# Patient Record
Sex: FEMALE | Race: BLACK OR AFRICAN AMERICAN | NOT HISPANIC OR LATINO | Employment: FULL TIME | URBAN - METROPOLITAN AREA
[De-identification: names, ages, dates, MRNs, and addresses within clinical notes are randomized per-mention and may not be internally consistent; named-entity substitution may affect disease eponyms.]

---

## 2023-02-23 LAB
ALANINE AMINOTRANSFERASE (SGPT) (U/L) IN SER/PLAS: 11 U/L (ref 7–45)
ALBUMIN (G/DL) IN SER/PLAS: 3.6 G/DL (ref 3.4–5)
ALKALINE PHOSPHATASE (U/L) IN SER/PLAS: 73 U/L (ref 33–110)
ASPARTATE AMINOTRANSFERASE (SGOT) (U/L) IN SER/PLAS: 17 U/L (ref 9–39)
BILIRUBIN DIRECT (MG/DL) IN SER/PLAS: 0.1 MG/DL (ref 0–0.3)
BILIRUBIN TOTAL (MG/DL) IN SER/PLAS: 0.5 MG/DL (ref 0–1.2)
PROTEIN TOTAL: 8.3 G/DL (ref 6.4–8.2)

## 2023-03-20 LAB
ALANINE AMINOTRANSFERASE (SGPT) (U/L) IN SER/PLAS: 14 U/L (ref 7–45)
ALBUMIN (G/DL) IN SER/PLAS: 3.7 G/DL (ref 3.4–5)
ALKALINE PHOSPHATASE (U/L) IN SER/PLAS: 79 U/L (ref 33–110)
ANION GAP IN SER/PLAS: 10 MMOL/L (ref 10–20)
ASPARTATE AMINOTRANSFERASE (SGOT) (U/L) IN SER/PLAS: 19 U/L (ref 9–39)
BILIRUBIN TOTAL (MG/DL) IN SER/PLAS: 0.3 MG/DL (ref 0–1.2)
CALCIUM (MG/DL) IN SER/PLAS: 9.6 MG/DL (ref 8.6–10.6)
CARBON DIOXIDE, TOTAL (MMOL/L) IN SER/PLAS: 31 MMOL/L (ref 21–32)
CHLORIDE (MMOL/L) IN SER/PLAS: 105 MMOL/L (ref 98–107)
CREATININE (MG/DL) IN SER/PLAS: 0.95 MG/DL (ref 0.5–1.05)
ERYTHROCYTE DISTRIBUTION WIDTH (RATIO) BY AUTOMATED COUNT: 15.1 % (ref 11.5–14.5)
ERYTHROCYTE MEAN CORPUSCULAR HEMOGLOBIN CONCENTRATION (G/DL) BY AUTOMATED: 30.5 G/DL (ref 32–36)
ERYTHROCYTE MEAN CORPUSCULAR VOLUME (FL) BY AUTOMATED COUNT: 93 FL (ref 80–100)
ERYTHROCYTES (10*6/UL) IN BLOOD BY AUTOMATED COUNT: 4.15 X10E12/L (ref 4–5.2)
FOLATE (NG/ML) IN SER/PLAS: 7.9 NG/ML
GFR FEMALE: 69 ML/MIN/1.73M2
GLUCOSE (MG/DL) IN SER/PLAS: 95 MG/DL (ref 74–99)
HEMATOCRIT (%) IN BLOOD BY AUTOMATED COUNT: 38.4 % (ref 36–46)
HEMOGLOBIN (G/DL) IN BLOOD: 11.7 G/DL (ref 12–16)
IRON (UG/DL) IN SER/PLAS: 31 UG/DL (ref 35–150)
IRON BINDING CAPACITY (UG/DL) IN SER/PLAS: 318 UG/DL (ref 240–445)
IRON SATURATION (%) IN SER/PLAS: 10 % (ref 25–45)
LEUKOCYTES (10*3/UL) IN BLOOD BY AUTOMATED COUNT: 5.8 X10E9/L (ref 4.4–11.3)
NRBC (PER 100 WBCS) BY AUTOMATED COUNT: 0 /100 WBC (ref 0–0)
PLATELETS (10*3/UL) IN BLOOD AUTOMATED COUNT: 268 X10E9/L (ref 150–450)
POTASSIUM (MMOL/L) IN SER/PLAS: 3.6 MMOL/L (ref 3.5–5.3)
PROTEIN TOTAL: 7.9 G/DL (ref 6.4–8.2)
SODIUM (MMOL/L) IN SER/PLAS: 142 MMOL/L (ref 136–145)
UREA NITROGEN (MG/DL) IN SER/PLAS: 14 MG/DL (ref 6–23)

## 2023-05-09 ENCOUNTER — PATIENT OUTREACH (OUTPATIENT)
Dept: PRIMARY CARE | Facility: CLINIC | Age: 58
End: 2023-05-09
Payer: COMMERCIAL

## 2023-05-09 DIAGNOSIS — R50.9 FEVER WITH CHILLS: ICD-10-CM

## 2023-05-09 NOTE — PROGRESS NOTES
TCM complete:5/9/23  Discharge date:5/7/23  2 attempts were made to reach patient to assess needs. No return call as of this note.   If patient schedules follow up within 14 days of discharge, visit is TCM billable.  Message sent to practice clinical pool to reach out to patient and schedule an appointment within 7-13 days from discharge date.    If patient meets criteria for moderately complex & has follow-up within 14 days-can bill [phone] for hospital follow up.   If patient meets criteria for highly complex & has follow-up visit within 7 days-can bill [phone] for hospital follow up

## 2023-06-06 NOTE — PROGRESS NOTES
Patient never followed up with PCP.  Unable to reach patient for call back after patient's follow up appointment with PCP.  LVM  with call back number for patient to call if needed.

## 2023-06-07 DIAGNOSIS — K21.9 GASTROESOPHAGEAL REFLUX DISEASE WITHOUT ESOPHAGITIS: ICD-10-CM

## 2023-06-07 RX ORDER — OMEPRAZOLE 40 MG/1
40 CAPSULE, DELAYED RELEASE ORAL DAILY
COMMUNITY
Start: 2020-12-16 | End: 2023-06-07 | Stop reason: SDUPTHER

## 2023-06-07 RX ORDER — OMEPRAZOLE 40 MG/1
40 CAPSULE, DELAYED RELEASE ORAL DAILY
Qty: 90 CAPSULE | Refills: 1 | Status: SHIPPED | OUTPATIENT
Start: 2023-06-07 | End: 2023-11-07 | Stop reason: SDUPTHER

## 2023-08-08 DIAGNOSIS — I10 BENIGN ESSENTIAL HYPERTENSION: ICD-10-CM

## 2023-08-08 RX ORDER — AMLODIPINE BESYLATE 5 MG/1
5 TABLET ORAL DAILY
Qty: 90 TABLET | Refills: 0 | Status: SHIPPED | OUTPATIENT
Start: 2023-08-08 | End: 2023-11-07 | Stop reason: SDUPTHER

## 2023-08-08 RX ORDER — AMLODIPINE BESYLATE 5 MG/1
5 TABLET ORAL DAILY
COMMUNITY
End: 2023-08-08 | Stop reason: SDUPTHER

## 2023-09-18 ENCOUNTER — LAB (OUTPATIENT)
Dept: LAB | Facility: LAB | Age: 58
End: 2023-09-18
Payer: COMMERCIAL

## 2023-09-18 ENCOUNTER — OFFICE VISIT (OUTPATIENT)
Dept: PRIMARY CARE | Facility: CLINIC | Age: 58
End: 2023-09-18
Payer: COMMERCIAL

## 2023-09-18 VITALS
SYSTOLIC BLOOD PRESSURE: 122 MMHG | BODY MASS INDEX: 28.71 KG/M2 | WEIGHT: 156 LBS | DIASTOLIC BLOOD PRESSURE: 80 MMHG | OXYGEN SATURATION: 98 % | HEART RATE: 68 BPM | HEIGHT: 62 IN

## 2023-09-18 DIAGNOSIS — R73.9 ELEVATED BLOOD SUGAR: ICD-10-CM

## 2023-09-18 DIAGNOSIS — I10 BENIGN ESSENTIAL HYPERTENSION: ICD-10-CM

## 2023-09-18 DIAGNOSIS — D50.8 OTHER IRON DEFICIENCY ANEMIA: ICD-10-CM

## 2023-09-18 DIAGNOSIS — E55.9 VITAMIN D DEFICIENCY: ICD-10-CM

## 2023-09-18 DIAGNOSIS — I10 BENIGN ESSENTIAL HYPERTENSION: Primary | ICD-10-CM

## 2023-09-18 DIAGNOSIS — I67.1 ANEURYSM, CEREBRAL (HHS-HCC): ICD-10-CM

## 2023-09-18 DIAGNOSIS — I47.10 SUPRAVENTRICULAR TACHYCARDIA (CMS-HCC): ICD-10-CM

## 2023-09-18 PROBLEM — Q61.2 AUTOSOMAL DOMINANT POLYCYSTIC KIDNEY DISEASE: Status: ACTIVE | Noted: 2023-09-18

## 2023-09-18 PROBLEM — K21.9 GERD (GASTROESOPHAGEAL REFLUX DISEASE): Status: ACTIVE | Noted: 2023-09-18

## 2023-09-18 PROBLEM — J30.9 ALLERGIC RHINITIS: Status: ACTIVE | Noted: 2023-09-18

## 2023-09-18 LAB
ALANINE AMINOTRANSFERASE (SGPT) (U/L) IN SER/PLAS: 16 U/L (ref 7–45)
ALBUMIN (G/DL) IN SER/PLAS: 4.2 G/DL (ref 3.4–5)
ALKALINE PHOSPHATASE (U/L) IN SER/PLAS: 93 U/L (ref 33–110)
ANION GAP IN SER/PLAS: 12 MMOL/L (ref 10–20)
ASPARTATE AMINOTRANSFERASE (SGOT) (U/L) IN SER/PLAS: 20 U/L (ref 9–39)
BILIRUBIN TOTAL (MG/DL) IN SER/PLAS: 0.4 MG/DL (ref 0–1.2)
CALCIDIOL (25 OH VITAMIN D3) (NG/ML) IN SER/PLAS: 36 NG/ML
CALCIUM (MG/DL) IN SER/PLAS: 9.7 MG/DL (ref 8.6–10.6)
CARBON DIOXIDE, TOTAL (MMOL/L) IN SER/PLAS: 31 MMOL/L (ref 21–32)
CHLORIDE (MMOL/L) IN SER/PLAS: 104 MMOL/L (ref 98–107)
COBALAMIN (VITAMIN B12) (PG/ML) IN SER/PLAS: 465 PG/ML (ref 211–911)
CREATININE (MG/DL) IN SER/PLAS: 0.92 MG/DL (ref 0.5–1.05)
ESTIMATED AVERAGE GLUCOSE FOR HBA1C: 120 MG/DL
FERRITIN (UG/LL) IN SER/PLAS: 49 UG/L (ref 8–150)
FOLATE (NG/ML) IN SER/PLAS: 18.9 NG/ML
GFR FEMALE: 72 ML/MIN/1.73M2
GLUCOSE (MG/DL) IN SER/PLAS: 94 MG/DL (ref 74–99)
HEMOGLOBIN A1C/HEMOGLOBIN TOTAL IN BLOOD: 5.8 %
IRON (UG/DL) IN SER/PLAS: 58 UG/DL (ref 35–150)
IRON BINDING CAPACITY (UG/DL) IN SER/PLAS: 360 UG/DL (ref 240–445)
IRON SATURATION (%) IN SER/PLAS: 16 % (ref 25–45)
MAGNESIUM (MG/DL) IN SER/PLAS: 2.08 MG/DL (ref 1.6–2.4)
POTASSIUM (MMOL/L) IN SER/PLAS: 3.8 MMOL/L (ref 3.5–5.3)
PROTEIN TOTAL: 7.8 G/DL (ref 6.4–8.2)
SODIUM (MMOL/L) IN SER/PLAS: 143 MMOL/L (ref 136–145)
THYROTROPIN (MIU/L) IN SER/PLAS BY DETECTION LIMIT <= 0.05 MIU/L: 0.42 MIU/L (ref 0.44–3.98)
THYROXINE (T4) FREE (NG/DL) IN SER/PLAS: 1.29 NG/DL (ref 0.78–1.48)
UREA NITROGEN (MG/DL) IN SER/PLAS: 13 MG/DL (ref 6–23)

## 2023-09-18 PROCEDURE — 83540 ASSAY OF IRON: CPT

## 2023-09-18 PROCEDURE — 80053 COMPREHEN METABOLIC PANEL: CPT

## 2023-09-18 PROCEDURE — 84443 ASSAY THYROID STIM HORMONE: CPT

## 2023-09-18 PROCEDURE — 3079F DIAST BP 80-89 MM HG: CPT | Performed by: FAMILY MEDICINE

## 2023-09-18 PROCEDURE — 3074F SYST BP LT 130 MM HG: CPT | Performed by: FAMILY MEDICINE

## 2023-09-18 PROCEDURE — 82746 ASSAY OF FOLIC ACID SERUM: CPT

## 2023-09-18 PROCEDURE — 83550 IRON BINDING TEST: CPT

## 2023-09-18 PROCEDURE — 1036F TOBACCO NON-USER: CPT | Performed by: FAMILY MEDICINE

## 2023-09-18 PROCEDURE — 82607 VITAMIN B-12: CPT

## 2023-09-18 PROCEDURE — 82728 ASSAY OF FERRITIN: CPT

## 2023-09-18 PROCEDURE — 82306 VITAMIN D 25 HYDROXY: CPT

## 2023-09-18 PROCEDURE — 84439 ASSAY OF FREE THYROXINE: CPT

## 2023-09-18 PROCEDURE — 36415 COLL VENOUS BLD VENIPUNCTURE: CPT

## 2023-09-18 PROCEDURE — 83735 ASSAY OF MAGNESIUM: CPT

## 2023-09-18 PROCEDURE — 99213 OFFICE O/P EST LOW 20 MIN: CPT | Performed by: FAMILY MEDICINE

## 2023-09-18 PROCEDURE — 83036 HEMOGLOBIN GLYCOSYLATED A1C: CPT

## 2023-09-18 RX ORDER — FLUTICASONE PROPIONATE 50 MCG
1 SPRAY, SUSPENSION (ML) NASAL 2 TIMES DAILY
COMMUNITY
End: 2023-12-14 | Stop reason: SDUPTHER

## 2023-09-18 ASSESSMENT — PATIENT HEALTH QUESTIONNAIRE - PHQ9
SUM OF ALL RESPONSES TO PHQ9 QUESTIONS 1 AND 2: 0
1. LITTLE INTEREST OR PLEASURE IN DOING THINGS: NOT AT ALL
2. FEELING DOWN, DEPRESSED OR HOPELESS: NOT AT ALL

## 2023-09-18 ASSESSMENT — ENCOUNTER SYMPTOMS
LOSS OF SENSATION IN FEET: 0
DEPRESSION: 0
OCCASIONAL FEELINGS OF UNSTEADINESS: 0

## 2023-09-18 NOTE — PROGRESS NOTES
"Subjective   Patient ID: Mandy Taylor is a 57 y.o. female who presents for Leg Pain (Right Leg).    HPI   The patient reports that she has been experiencing leg pain especially when she is laying down at nights. When she is laying on her back she does not experience any pain but the pain comes on when she moves to the left or right side. Her blood pressures are well-controlled at this time and she is taking amlodipine as prescribed. The patient is also taking omeprazole for GERD.     Review of Systems  Constitutional: No fever or chills  Cardiovascular: no chest pain, no palpitations and no syncope.   Respiratory: no cough, no shortness of breath during exertion and no shortness of breath at rest.   Gastrointestinal: no abdominal pain, no nausea and no vomiting.  Neuro: No Headache, no dizziness  MSK: + leg cramps.    Objective   /80   Pulse 68   Ht 1.575 m (5' 2\")   Wt 70.8 kg (156 lb)   SpO2 98%   BMI 28.53 kg/m²     Physical Exam  Constitutional: Alert and in no acute distress. Well developed, well nourished  Head and Face: Head and face: Normal.    Cardiovascular: Heart rate and rhythm were normal, normal S1 and S2. No peripheral edema.   Pulmonary: No respiratory distress. Clear bilateral breath sounds.  Musculoskeletal: Gait and station: Normal. Muscle strength/tone: Normal.   Skin: Normal skin color and pigmentation, normal skin turgor, and no rash.    Psychiatric: Judgment and insight: Intact. Mood and affect: Normal.    Lab Results   Component Value Date    WBC 5.6 05/07/2023    HGB 12.2 05/07/2023    HCT 39.3 05/07/2023     05/07/2023    CHOL 201 (H) 11/08/2022    TRIG 61 11/08/2022    HDL 76.1 11/08/2022    ALT 16 05/03/2023    AST 22 05/03/2023     05/07/2023    K 4.4 05/07/2023     05/07/2023    CREATININE 0.87 05/07/2023    BUN 19 05/07/2023    CO2 30 05/07/2023    TSH 0.54 12/26/2022    INR 1.2 (H) 02/07/2023    HGBA1C 5.7 (A) 10/12/2021       BI digital DIAG " mamm  Narrative: Interpreted By:  GALLITO MUJICA MD  MRN: [id number]  Patient Name: DEBRAVANESSA ROSANITA     STUDY:  DIGITAL DIAG MAMM BILAT WITH MATHIEU;  8/14/2023 9:31 am     ACCESSION NUMBER(S):  [id number]     ORDERING CLINICIAN:  JOHANN LION     INDICATION:  2nd short-term follow-up of probably benign right breast  calcifications. Annual mammogram.     COMPARISON:  08/12/2022, 03/19/2021 and 03/12/2021.     FINDINGS:  2D and tomosynthesis images were reviewed at 1 mm slice thickness.     Density:  There are areas of scattered fibroglandular tissue.     The calcifications of concern in the central right breast at mid  depth are unchanged. No suspicious masses or calcifications are  identified.     Impression: No mammographic evidence of malignancy.  Stable benign right breast  calcifications. No additional follow-up is warranted. Recommendations  are for a bilateral screening mammogram in 1 year.     BI-RADS CATEGORY:     Category: 2 - Benign.  Recommendation: 1 Year Screening.     For any future breast imaging appointments, please call [phone]  [phone]).     Patient letter sent SNORM     MACRO:  None      Assessment/Plan   Diagnoses and all orders for this visit:  Benign essential hypertension  -     Comprehensive Metabolic Panel; Future  -     Magnesium; Future  Supraventricular tachycardia (CMS/HCC)  Aneurysm, cerebral  Vitamin D deficiency  -     Vitamin D 25-Hydroxy,Total (for eval of Vitamin D levels); Future  Other iron deficiency anemia  -     Ferritin; Future  -     Iron and TIBC; Future  -     Vitamin B12; Future  -     Folate; Future  Elevated blood sugar  -     Hemoglobin A1C; Future  -     TSH with reflex to Free T4 if abnormal; Future        Dear Mandy Taylor     It was my pleasure to take care of you today in the office. Below are the things we discussed today:    1. Leg cramps: Labs ordered.    2. Hypertension: Well- controlled. Continue amlodipine.     3. GERD: Continue omeprazole.     Your yearly  Physical is due in: Nov 2023  When you call the office for your yearly Physical, please ask them to inform me to order your blood work, so that you can get the fasting blood work before your appointment and we can discuss the results at your physical.      Please call me if any questions arise from now until your next visit. I will call you after I am done seeing patients. A Doctor is always available by phone when the office is closed. Please feel free to call for help with any problem that you feel shouldn't wait until the office re-opens.     Scribe Attestation  By signing my name below, INanette Scribe   attest that this documentation has been prepared under the direction and in the presence of Martha Connell MD.

## 2023-09-21 ENCOUNTER — TELEPHONE (OUTPATIENT)
Dept: PRIMARY CARE | Facility: CLINIC | Age: 58
End: 2023-09-21
Payer: COMMERCIAL

## 2023-10-04 ENCOUNTER — TELEPHONE (OUTPATIENT)
Dept: PRIMARY CARE | Facility: CLINIC | Age: 58
End: 2023-10-04
Payer: COMMERCIAL

## 2023-10-04 DIAGNOSIS — R25.2 LEG CRAMPS: Primary | ICD-10-CM

## 2023-10-11 NOTE — PROGRESS NOTES
Referred by Dr. Mo for aortic plaque     History Of Present Illness:    Mandy Taylor is a 58 y.o. 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 Mo for aortic valve dx. says she feels great since ablation.  No further palpitations.  She denies any chest pain, shortness of breath, lightheadedness dizziness presyncope or syncope.  Non-smoker and rare EtOH.  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    I reviewed echo and CT scan. No evidence of aortic stenosis or significant aortic plaque that I can ascertain.     ECHO 12/14/2022:  1. Left ventricular systolic function is hyperdynamic with a 70-75% estimated ejection fraction.   2. Mildly elevated RVSP.   3. Mild aortic valve stenosis.   4. There is plaque visualized in the ascending aorta.   5. LVEF is more hyperdynamic. Prior study did not comment on Aortic Gradients. Otherwise, no significant changes noted.      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    Past Surgical History:  She has a past surgical history that includes Anterior cruciate ligament repair (06/25/2013); MR angio head wo IV contrast (12/23/2016); and US guided percutaneous peritoneal or retroperitoneal fluid collection drainage (2/6/2023).      Social History:  She reports that she has never smoked. She has never used smokeless tobacco. No history on file for alcohol use and drug use.    Family History:  No family history on file.     Allergies:  Ace inhibitors and Iodinated contrast media    Outpatient Medications:  Current  Outpatient Medications   Medication Instructions    amLODIPine (NORVASC) 5 mg, oral, Daily    fluticasone (Flonase) 50 mcg/actuation nasal spray 1 spray, nasal, 2 times daily    omeprazole (PRILOSEC) 40 mg, oral, Daily        Last Recorded Vitals:  Vitals:    10/13/23 0912 10/13/23 0939   BP: (!) 151/92 122/82   BP Location: Left arm    Pulse: 75    SpO2: 96%    Weight: 71.7 kg (158 lb)        Physical Exam:  GENERAL: alert, cooperative, pleasant, in no acute distress  SKIN: warm, dry, no rash.  NECK: no JVD, no TABBY  CARDIAC: Regular rate and rhythm with no rubs, murmurs, or gallops  CHEST: Normal respiratory efforts, lungs clear to auscultation bilaterally.  ABDOMEN: soft, nontender, nondistended  EXTREMITIES: no edema  NEURO: Alert and oriented x 3.  Grossly normal.  Moves all 4 extremities.         Last Labs:  CBC -  Lab Results   Component Value Date    WBC 5.6 05/07/2023    HGB 12.2 05/07/2023    HCT 39.3 05/07/2023    MCV 92 05/07/2023     05/07/2023       CMP -  Lab Results   Component Value Date    CALCIUM 9.7 09/18/2023    PHOS 3.5 05/06/2023    PROT 7.8 09/18/2023    ALBUMIN 4.2 09/18/2023    AST 20 09/18/2023    ALT 16 09/18/2023    ALKPHOS 93 09/18/2023    BILITOT 0.4 09/18/2023       LIPID PANEL -   Lab Results   Component Value Date    CHOL 201 (H) 11/08/2022    TRIG 61 11/08/2022    HDL 76.1 11/08/2022    CHHDL 2.6 11/08/2022    LDLF 113 (H) 11/08/2022    VLDL 12 11/08/2022       RENAL FUNCTION PANEL -   Lab Results   Component Value Date    GLUCOSE 94 09/18/2023     09/18/2023    K 3.8 09/18/2023     09/18/2023    CO2 31 09/18/2023    ANIONGAP 12 09/18/2023    BUN 13 09/18/2023    CREATININE 0.92 09/18/2023    CALCIUM 9.7 09/18/2023    PHOS 3.5 05/06/2023    ALBUMIN 4.2 09/18/2023        Lab Results   Component Value Date     (H) 12/29/2021    HGBA1C 5.8 (A) 09/18/2023       Last Cardiology Tests:    Echo:12/14/22  ONCLUSIONS:   1. Left ventricular systolic function is  hyperdynamic with a 70-75% estimated ejection fraction.   2. Mildly elevated RVSP.   3. Mild aortic valve stenosis.   4. There is plaque visualized in the ascending aorta.   5. LVEF is more hyperdynamic. Prior study did not comment on Aortic Gradients. Otherwise, no significant changes noted.         Assessment/Plan   58 y.o. 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 No evidence of aortic stenosis or significant aortic plaque that I can ascertain. RecommendCT coronary calcium score for further evaluation to discern if any aortic plaque as well as coronary risk score.  Follow-up TBD based on testing    Junior Powell DO  [facility] [place]  Clinical and Preventive Cardiology  Director, Cardiac Rehabilitation

## 2023-10-12 PROBLEM — M75.32: Status: ACTIVE | Noted: 2023-10-12

## 2023-10-12 PROBLEM — B37.31 VAGINAL CANDIDIASIS: Status: ACTIVE | Noted: 2023-10-12

## 2023-10-12 PROBLEM — K21.9 GASTROESOPHAGEAL REFLUX DISEASE WITHOUT ESOPHAGITIS: Status: ACTIVE | Noted: 2023-02-11

## 2023-10-12 PROBLEM — E04.9 NONTOXIC GOITER: Status: ACTIVE | Noted: 2023-02-11

## 2023-10-12 PROBLEM — E87.6 HYPOKALEMIA: Status: ACTIVE | Noted: 2023-02-11

## 2023-10-12 PROBLEM — Z87.891 PERSONAL HISTORY OF NICOTINE DEPENDENCE: Status: ACTIVE | Noted: 2023-02-11

## 2023-10-12 PROBLEM — N83.209 HEMORRHAGIC OVARIAN CYST: Status: ACTIVE | Noted: 2023-10-12

## 2023-10-12 PROBLEM — K75.0 ABSCESS OF LIVER (HHS-HCC): Status: ACTIVE | Noted: 2023-02-11

## 2023-10-12 PROBLEM — H04.122 DRY EYE SYNDROME OF LEFT LACRIMAL GLAND: Status: RESOLVED | Noted: 2023-10-12 | Resolved: 2023-10-12

## 2023-10-12 PROBLEM — H04.122 DRY EYE SYNDROME OF LEFT LACRIMAL GLAND: Status: ACTIVE | Noted: 2023-10-12

## 2023-10-12 PROBLEM — H01.019 BLEPHARITIS, ULCERATIVE: Status: ACTIVE | Noted: 2023-10-12

## 2023-10-12 PROBLEM — M99.01 SEGMENTAL AND SOMATIC DYSFUNCTION OF CERVICAL REGION: Status: ACTIVE | Noted: 2023-10-12

## 2023-10-12 PROBLEM — R35.0 URINARY FREQUENCY: Status: ACTIVE | Noted: 2023-10-12

## 2023-10-12 PROBLEM — H61.23 BILATERAL IMPACTED CERUMEN: Status: ACTIVE | Noted: 2023-10-12

## 2023-10-12 PROBLEM — M75.31 CALCIFIC TENDINITIS OF RIGHT SHOULDER: Status: ACTIVE | Noted: 2023-10-12

## 2023-10-12 PROBLEM — Z79.2 LONG TERM (CURRENT) USE OF ANTIBIOTICS: Status: ACTIVE | Noted: 2023-02-11

## 2023-10-12 PROBLEM — I10 PRIMARY HYPERTENSION: Status: ACTIVE | Noted: 2023-01-16

## 2023-10-12 PROBLEM — N18.2 CKD (CHRONIC KIDNEY DISEASE) STAGE 2, GFR 60-89 ML/MIN: Status: ACTIVE | Noted: 2023-01-16

## 2023-10-12 PROBLEM — Q44.6 CYSTIC DISEASE OF LIVER: Status: ACTIVE | Noted: 2023-02-11

## 2023-10-12 PROBLEM — Q61.3 POLYCYSTIC KIDNEY: Status: ACTIVE | Noted: 2023-01-16

## 2023-10-12 PROBLEM — D64.9 ANEMIA: Status: ACTIVE | Noted: 2023-10-12

## 2023-10-12 PROBLEM — Z86.79 HISTORY OF PAROXYSMAL SUPRAVENTRICULAR TACHYCARDIA: Status: ACTIVE | Noted: 2023-10-12

## 2023-10-12 PROBLEM — H04.123 DRY EYE SYNDROME OF BILATERAL LACRIMAL GLANDS: Status: ACTIVE | Noted: 2023-10-12

## 2023-10-12 PROBLEM — G25.89 SCAPULAR DYSKINESIS: Status: ACTIVE | Noted: 2023-10-12

## 2023-10-12 PROBLEM — N20.0 CALCULUS OF KIDNEY: Status: ACTIVE | Noted: 2023-02-11

## 2023-10-12 RX ORDER — CETIRIZINE HYDROCHLORIDE 10 MG/1
TABLET, CHEWABLE ORAL DAILY
COMMUNITY

## 2023-10-12 RX ORDER — CIPROFLOXACIN 750 MG/1
750 TABLET, FILM COATED ORAL 2 TIMES DAILY
COMMUNITY
Start: 2023-02-09 | End: 2023-11-07 | Stop reason: ALTCHOICE

## 2023-10-12 RX ORDER — IBUPROFEN 800 MG/1
800 TABLET ORAL EVERY 6 HOURS PRN
COMMUNITY
End: 2023-11-07 | Stop reason: ALTCHOICE

## 2023-10-12 RX ORDER — OMEPRAZOLE 40 MG/1
40 CAPSULE, DELAYED RELEASE ORAL DAILY
COMMUNITY
Start: 2023-02-11 | End: 2023-11-07 | Stop reason: SDUPTHER

## 2023-10-13 ENCOUNTER — OFFICE VISIT (OUTPATIENT)
Dept: CARDIOLOGY | Facility: CLINIC | Age: 58
End: 2023-10-13
Payer: COMMERCIAL

## 2023-10-13 ENCOUNTER — HOSPITAL ENCOUNTER (OUTPATIENT)
Dept: VASCULAR MEDICINE | Facility: HOSPITAL | Age: 58
Discharge: HOME | End: 2023-10-13
Payer: COMMERCIAL

## 2023-10-13 VITALS
OXYGEN SATURATION: 96 % | WEIGHT: 158 LBS | BODY MASS INDEX: 28.9 KG/M2 | DIASTOLIC BLOOD PRESSURE: 82 MMHG | HEART RATE: 75 BPM | SYSTOLIC BLOOD PRESSURE: 122 MMHG

## 2023-10-13 DIAGNOSIS — M79.604 PAIN IN RIGHT LEG: ICD-10-CM

## 2023-10-13 DIAGNOSIS — M79.605 PAIN IN LEFT LEG: ICD-10-CM

## 2023-10-13 DIAGNOSIS — R25.2 LEG CRAMPS: ICD-10-CM

## 2023-10-13 DIAGNOSIS — I10 HYPERTENSION, UNSPECIFIED TYPE: ICD-10-CM

## 2023-10-13 DIAGNOSIS — I47.19 AVNRT (AV NODAL RE-ENTRY TACHYCARDIA) (CMS-HCC): ICD-10-CM

## 2023-10-13 DIAGNOSIS — I70.0 AORTIC ATHEROSCLEROSIS (CMS-HCC): Primary | ICD-10-CM

## 2023-10-13 PROCEDURE — 93970 EXTREMITY STUDY: CPT

## 2023-10-13 PROCEDURE — 3079F DIAST BP 80-89 MM HG: CPT | Performed by: INTERNAL MEDICINE

## 2023-10-13 PROCEDURE — 99215 OFFICE O/P EST HI 40 MIN: CPT | Performed by: INTERNAL MEDICINE

## 2023-10-13 PROCEDURE — 3074F SYST BP LT 130 MM HG: CPT | Performed by: INTERNAL MEDICINE

## 2023-10-13 PROCEDURE — 1036F TOBACCO NON-USER: CPT | Performed by: INTERNAL MEDICINE

## 2023-10-13 PROCEDURE — 93970 EXTREMITY STUDY: CPT | Performed by: INTERNAL MEDICINE

## 2023-11-07 ENCOUNTER — OFFICE VISIT (OUTPATIENT)
Dept: PRIMARY CARE | Facility: CLINIC | Age: 58
End: 2023-11-07
Payer: COMMERCIAL

## 2023-11-07 VITALS
WEIGHT: 154 LBS | SYSTOLIC BLOOD PRESSURE: 132 MMHG | BODY MASS INDEX: 27.29 KG/M2 | HEART RATE: 85 BPM | DIASTOLIC BLOOD PRESSURE: 88 MMHG | HEIGHT: 63 IN | OXYGEN SATURATION: 97 %

## 2023-11-07 DIAGNOSIS — Z12.31 VISIT FOR SCREENING MAMMOGRAM: ICD-10-CM

## 2023-11-07 DIAGNOSIS — Q61.2 AUTOSOMAL DOMINANT POLYCYSTIC KIDNEY DISEASE: ICD-10-CM

## 2023-11-07 DIAGNOSIS — K21.9 GASTROESOPHAGEAL REFLUX DISEASE WITHOUT ESOPHAGITIS: ICD-10-CM

## 2023-11-07 DIAGNOSIS — D50.8 OTHER IRON DEFICIENCY ANEMIA: ICD-10-CM

## 2023-11-07 DIAGNOSIS — Z00.00 ANNUAL PHYSICAL EXAM: Primary | ICD-10-CM

## 2023-11-07 DIAGNOSIS — Z86.79 HISTORY OF PAROXYSMAL SUPRAVENTRICULAR TACHYCARDIA: ICD-10-CM

## 2023-11-07 DIAGNOSIS — Z23 ENCOUNTER FOR IMMUNIZATION: ICD-10-CM

## 2023-11-07 DIAGNOSIS — I10 BENIGN ESSENTIAL HYPERTENSION: ICD-10-CM

## 2023-11-07 PROBLEM — I47.10 SUSTAINED SUPRAVENTRICULAR TACHYCARDIA (CMS-HCC): Status: RESOLVED | Noted: 2023-09-18 | Resolved: 2023-11-07

## 2023-11-07 PROBLEM — Q61.3 POLYCYSTIC KIDNEY: Status: RESOLVED | Noted: 2023-01-16 | Resolved: 2023-11-07

## 2023-11-07 PROBLEM — B37.31 VAGINAL CANDIDIASIS: Status: RESOLVED | Noted: 2023-10-12 | Resolved: 2023-11-07

## 2023-11-07 PROBLEM — Z79.2 LONG TERM (CURRENT) USE OF ANTIBIOTICS: Status: RESOLVED | Noted: 2023-02-11 | Resolved: 2023-11-07

## 2023-11-07 PROCEDURE — 99396 PREV VISIT EST AGE 40-64: CPT | Performed by: FAMILY MEDICINE

## 2023-11-07 PROCEDURE — 1036F TOBACCO NON-USER: CPT | Performed by: FAMILY MEDICINE

## 2023-11-07 PROCEDURE — 3075F SYST BP GE 130 - 139MM HG: CPT | Performed by: FAMILY MEDICINE

## 2023-11-07 PROCEDURE — 90686 IIV4 VACC NO PRSV 0.5 ML IM: CPT | Performed by: FAMILY MEDICINE

## 2023-11-07 PROCEDURE — 90471 IMMUNIZATION ADMIN: CPT | Performed by: FAMILY MEDICINE

## 2023-11-07 PROCEDURE — 3079F DIAST BP 80-89 MM HG: CPT | Performed by: FAMILY MEDICINE

## 2023-11-07 RX ORDER — OMEPRAZOLE 40 MG/1
40 CAPSULE, DELAYED RELEASE ORAL DAILY
Qty: 90 CAPSULE | Refills: 1 | Status: SHIPPED | OUTPATIENT
Start: 2023-11-07 | End: 2024-05-14 | Stop reason: SDUPTHER

## 2023-11-07 RX ORDER — AMLODIPINE BESYLATE 5 MG/1
5 TABLET ORAL DAILY
Qty: 90 TABLET | Refills: 1 | Status: SHIPPED | OUTPATIENT
Start: 2023-11-07 | End: 2024-05-14 | Stop reason: SDUPTHER

## 2023-11-07 ASSESSMENT — PATIENT HEALTH QUESTIONNAIRE - PHQ9
SUM OF ALL RESPONSES TO PHQ9 QUESTIONS 1 AND 2: 0
2. FEELING DOWN, DEPRESSED OR HOPELESS: NOT AT ALL
1. LITTLE INTEREST OR PLEASURE IN DOING THINGS: NOT AT ALL

## 2023-11-07 ASSESSMENT — COLUMBIA-SUICIDE SEVERITY RATING SCALE - C-SSRS
1. IN THE PAST MONTH, HAVE YOU WISHED YOU WERE DEAD OR WISHED YOU COULD GO TO SLEEP AND NOT WAKE UP?: NO
2. HAVE YOU ACTUALLY HAD ANY THOUGHTS OF KILLING YOURSELF?: NO

## 2023-11-07 NOTE — PROGRESS NOTES
"Subjective   Patient ID: Mandy Taylor is a 58 y.o. female who presents for Annual Exam.    Last Annual Physical: Nov 2022   Last Dental Visit: Up-to-date  Last Eye exam: Within the year   Hearing Concerns: No   Diet: Well-balanced   Exercise Routine: Regular walking       HPI   The patient reports that she is taking amlodipine for her hypertension and omeprazole for GERD as prescribed and is tolerating them well. She is also taking a prenatal for her anemia. She complains of leg pain.    Review of Systems  Constitutional: No fever or chills, No Night Sweats  Eyes: No Blurry Vision or Eye sight problems  ENT: No Nasal Discharge, Hoarseness, sore throat  Cardiovascular: no chest pain, no palpitations and no syncope.   Respiratory: no cough, no shortness of breath during exertion and no shortness of breath at rest.   Gastrointestinal: no abdominal pain, no nausea and no vomiting.   : No vaginal discharge, burning with urination, no blood in urine or stools  Skin: No Skin rashes or Lesions  Neuro: No Headache, no dizziness or Numbness or tingling  Psych: No Anxiety, depression or sleeping problems  Heme: No Easy bleeding or brusing.   MSK: + leg pain.    Objective   /88 (BP Location: Right arm, Patient Position: Sitting, BP Cuff Size: Adult)   Pulse 85   Ht 1.588 m (5' 2.5\")   Wt 69.9 kg (154 lb)   SpO2 97%   BMI 27.72 kg/m²     Physical Exam  Patient declined Chaperone  Constitutional: Alert and in no acute distress. Well developed, well nourished.   Head and Face: Head and face: Normal.    Eyes: Normal external exam. Pupils were equal in size, round, reactive to light (PERRL) with normal accommodation and extraocular movements intact (EOMI).   Ears, Nose, Mouth, and Throat: External inspection of ears and nose: Normal.  Hearing: Normal.  Nasal mucosa, septum, and turbinates: Normal.  Lips, teeth, and gums: Normal.  Oropharynx: Normal.   Neck: No neck mass was observed. Supple. Thyroid not enlarged " and there were no palpable thyroid nodules.   Cardiovascular: Heart rate and rhythm were normal, normal S1 and S2. Pedal pulses: Normal. No peripheral edema.   Pulmonary: No respiratory distress. Clear bilateral breath sounds.   Breast: Normal Appearance, No Masses or lumps palpated  Abdomen: Soft nontender; no abdominal mass palpated. Normal bowel sounds. No organomegaly.   Musculoskeletal: No joint swelling seen, normal movements of all extremities. Range of motion: Normal.  Muscle strength/tone: Normal.    Skin: Normal skin color and pigmentation, normal skin turgor, and no rash.   Neurologic: Deep tendon reflexes were 2+ and symmetric.   Psychiatric: Judgment and insight: Intact. Mood and affect: Normal.  Lymphatic: No cervical lymphadenopathy. Palpation of lymph nodes in axillae: Normal.  Palpation of lymph nodes in groin: Normal.    Lab Results   Component Value Date    WBC 5.6 05/07/2023    HGB 12.2 05/07/2023    HCT 39.3 05/07/2023     05/07/2023    CHOL 201 (H) 11/08/2022    TRIG 61 11/08/2022    HDL 76.1 11/08/2022    ALT 16 09/18/2023    AST 20 09/18/2023     09/18/2023    K 3.8 09/18/2023     09/18/2023    CREATININE 0.92 09/18/2023    BUN 13 09/18/2023    CO2 31 09/18/2023    TSH 0.42 (L) 09/18/2023    INR 1.2 (H) 02/07/2023    HGBA1C 5.8 (A) 09/18/2023           Assessment/Plan   Diagnoses and all orders for this visit:  Annual physical exam  Benign essential hypertension  -     amLODIPine (Norvasc) 5 mg tablet; Take 1 tablet (5 mg) by mouth once daily.  History of paroxysmal supraventricular tachycardia  Gastroesophageal reflux disease without esophagitis  -     omeprazole (PriLOSEC) 40 mg DR capsule; Take 1 capsule (40 mg) by mouth once daily.  Autosomal dominant polycystic kidney disease  Encounter for immunization  -     Flu vaccine (IIV4) age 6 months and greater, preservative free  Visit for screening mammogram  -     BI mammo bilateral screening tomosynthesis; Future  Other  iron deficiency anemia  -     Ferritin; Future  -     Iron and TIBC; Future  -     CBC; Future  -     Hemoglobin A1C; Future  -     Comprehensive Metabolic Panel; Future        Dear Mandy Taylor     It was my pleasure to take care of you today in the office. Below are the things we discussed today:    1. 1. Immunizations: Yearly Flu shot is recommended. Given today          a: COVID: Please get booster from the pharmacy          b: Tetanus: Up-to-date         c: Shingrix: The patient will come back for it          d: Prevnar: The patient will come back for it     2. Blood Work: Reviewed   3. Seen your dentist twice a year  4. Yearly Eye exam is recommended    5. BMI: Overweight   6: Diet recommendations:   Eat Clean, Try to have as many home cooked meals as possible  Avoid processed foods which contain excess calories, sugar, and sodium.    7. Exercise recommendations:   150 minutes a week to maintain your weight     If you have to loose weight, you need a better diet and exercise plan.     8. Supplements recommended:  a - Calcium 600 mg up to twice a day to get a total of 1200 mg. Each 8 oz of milk or yogurt or 1 oz of cheese, 1 Banana, 1 serving of green Leafy vegetable has about 300 mg of Calcium, so you may subtract that amount. Calcium citrate is the only acceptable supplement to take if you take an acid suppressing medication like Prilosec; otherwise Calcium carbonate is acceptable too (It can cause Constipation).   b - Vitamin D - 2000 IU daily     9. Please get your Living will / Advance directive completed if you do not have one already. Please make sure our office has a copy of the latest one.     10. Colonoscopy: Uptodate. Last done in Feb 2016, repeat in Feb 2026   11. Mammogram: Uptodate, ordered for Aug 2024   12. PAP: Up-to-date. Last done in Nov 2022. Cytology showed ascus and HPV negative.   13. Skin Check: Please see Dermatology once a year for a Skin Check.     14. Hypertension: Continue  amlodipine.     15. Anemia: Continue prenatal.    16. Prediabetes: A1c showed 5.8. We will continue to monitor. Recommended that the patient decrease sugar intake.    17. GERD: Continue Omeprazole.    Follow up in 6 months.    Follow up in one year for a Physical. Please call the office before your Physical to see if you need blood work completed prior to your physical.     Please call me if any questions arise from now until your next visit. I will call you after I am done seeing patients. A Doctor is always available by phone when the office is closed. Please feel free to call for help with any problem that you feel shouldn't wait until the office re-opens.     Scribe Attestation  By signing my name below, INanette Scribe   attest that this documentation has been prepared under the direction and in the presence of Martha Connell MD.

## 2023-11-17 ENCOUNTER — CLINICAL SUPPORT (OUTPATIENT)
Dept: PRIMARY CARE | Facility: CLINIC | Age: 58
End: 2023-11-17
Payer: COMMERCIAL

## 2023-11-17 DIAGNOSIS — Z23 NEED FOR VACCINATION: ICD-10-CM

## 2023-11-17 PROCEDURE — 90750 HZV VACC RECOMBINANT IM: CPT | Performed by: FAMILY MEDICINE

## 2023-11-17 PROCEDURE — 90471 IMMUNIZATION ADMIN: CPT | Performed by: FAMILY MEDICINE

## 2023-12-14 DIAGNOSIS — J30.9 ALLERGIC RHINITIS, UNSPECIFIED SEASONALITY, UNSPECIFIED TRIGGER: ICD-10-CM

## 2023-12-14 RX ORDER — FLUTICASONE PROPIONATE 50 MCG
1 SPRAY, SUSPENSION (ML) NASAL 2 TIMES DAILY
Qty: 16 G | Refills: 3 | Status: SHIPPED | OUTPATIENT
Start: 2023-12-14

## 2023-12-19 ENCOUNTER — HOSPITAL ENCOUNTER (OUTPATIENT)
Dept: RADIOLOGY | Facility: EXTERNAL LOCATION | Age: 58
Discharge: HOME | End: 2023-12-19

## 2023-12-19 DIAGNOSIS — S63.659A SPRAIN OF METACARPOPHALANGEAL JOINT OF UNSPECIFIED FINGER, INITIAL ENCOUNTER: ICD-10-CM

## 2023-12-29 ENCOUNTER — TELEPHONE (OUTPATIENT)
Dept: PRIMARY CARE | Facility: CLINIC | Age: 58
End: 2023-12-29
Payer: COMMERCIAL

## 2023-12-29 DIAGNOSIS — D50.8 OTHER IRON DEFICIENCY ANEMIA: ICD-10-CM

## 2023-12-29 DIAGNOSIS — I10 BENIGN ESSENTIAL HYPERTENSION: ICD-10-CM

## 2023-12-29 DIAGNOSIS — Q61.2 AUTOSOMAL DOMINANT POLYCYSTIC KIDNEY DISEASE: Primary | ICD-10-CM

## 2024-01-16 ENCOUNTER — OFFICE VISIT (OUTPATIENT)
Dept: ORTHOPEDIC SURGERY | Facility: CLINIC | Age: 59
End: 2024-01-16
Payer: COMMERCIAL

## 2024-01-16 ENCOUNTER — ANCILLARY PROCEDURE (OUTPATIENT)
Dept: RADIOLOGY | Facility: CLINIC | Age: 59
End: 2024-01-16
Payer: COMMERCIAL

## 2024-01-16 DIAGNOSIS — M16.11 ARTHRITIS OF RIGHT HIP: ICD-10-CM

## 2024-01-16 DIAGNOSIS — M79.605 LOWER EXTREMITY PAIN, BILATERAL: ICD-10-CM

## 2024-01-16 DIAGNOSIS — M79.604 LOWER EXTREMITY PAIN, BILATERAL: ICD-10-CM

## 2024-01-16 DIAGNOSIS — M25.551 GREATER TROCHANTERIC PAIN SYNDROME OF RIGHT LOWER EXTREMITY: Primary | ICD-10-CM

## 2024-01-16 PROCEDURE — 1036F TOBACCO NON-USER: CPT | Performed by: FAMILY MEDICINE

## 2024-01-16 PROCEDURE — 99204 OFFICE O/P NEW MOD 45 MIN: CPT | Performed by: FAMILY MEDICINE

## 2024-01-16 PROCEDURE — 73522 X-RAY EXAM HIPS BI 3-4 VIEWS: CPT

## 2024-01-16 NOTE — PROGRESS NOTES
** Please excuse any errors in grammar or translation related to this dictation. Voice recognition software was utilized to prepare this document. **    Assessment & Plan:  Inform patient that she has right arthritic changes of her bilateral hips on the setting of hip dysplasia, the only her right hip is currently symptomatic.  While exam findings do suggest a component of intra-articular source of her pain, her primary area of pain and tenderness is localizing to her greater trochanter.  Discussed with patient this is usually attributed to weakened hip musculature causing increased traction on its insertion site leading to inflammation and pain.  As initial management to both the arthritis and the trochanteric pain syndrome, recommending physical therapy for hip muscular strengthening program.  We discussed in the future if her symptoms do not improve consider greater trochanteric bursa injection or intra-articular steroid injection to address her symptoms.  Due to her hip dysplasia as well as arthritis down the road may require hip arthroplasty depending on her progression of symptoms.  Can continue intermittent use of OTC analgesics as needed for the time being.  Follow-up as needed for any ongoing concerns related to her hips.    In regards to her calf pain, do not feel that this is musculoskeletal in etiology.  She has no radicular symptoms.  She has no claudication symptoms.  Normal range of motion and strength of her knee and her ankle.  Recommend she follow-up with her PCP to discuss potential treatment for restless leg syndrome versus potential vascular etiology.    All questions answered and patient agrees with plan of care.    Chief complaint:  Bilateral leg pain    HPI:  59 y/o patient, hx of Left knee ACL reconstruction, HTN, CKD, polycystic KD, presents with bilateral leg pain and right hip pain.  This complaint has been ongoing for 5 months.  No mechanism of injury reported at onset. Symptoms have  progressively worsened with time.  Right hip pain is localized to her groin.  It is most prominent when laying down at night or when she first gets moving in the morning. Also notes some pain with prolonged activity.   Pain in her legs is localized posteriorly. Described as discomfort that only occurs at night when laying supine. Denies pain in thighs, buttock or lower back. No associated numbness or paresthesia. Uses otc NSAID or Tylenol very occasionally if symptoms are pronounced. Previously saw her PCP Dr. Connell who ordered an ultrasound to assess for DVTs.                                     Exam:  BILATERAL Hip Exam:  [Normal gait]  No warmth, erythema or ecchymosis overlying.  Active flexion >90 degrees with grossly normal extension, ,abduction, adduction, IR and ER.  TTP over Right greater trochanter  [5]/5 strength of hip flexion, abduction, & adduction  SILT  [ - ]Log roll pain, [ - ]FADIR pain, [ - ]DALE pain, [ + R ]Stinchfield,  [ - ]Scour  + Pain localized to lateral hip with resisted abduction    Full AROM of bilateral knees with 5/5 strength. No joint line ttp.  Full AROM of bilateral ankles with 5/5 strength.    No focal areas of tenderness with compression of bilateral legs.     Results:  X-rays of bilateral hips obtained 1/16/2024 reviewed and independent interpreted as mild to moderate degenerative change of the right hip with moderate changes left hip.  No acute fracture.  Hip dysplasia present.    Reviewed DVT ultrasound of lower extremities completed 10/13/2023 which was negative.

## 2024-02-13 ENCOUNTER — EVALUATION (OUTPATIENT)
Dept: PHYSICAL THERAPY | Facility: CLINIC | Age: 59
End: 2024-02-13
Payer: COMMERCIAL

## 2024-02-13 DIAGNOSIS — M25.551 GREATER TROCHANTERIC PAIN SYNDROME OF RIGHT LOWER EXTREMITY: ICD-10-CM

## 2024-02-13 DIAGNOSIS — M16.11 ARTHRITIS OF RIGHT HIP: ICD-10-CM

## 2024-02-13 PROCEDURE — 97110 THERAPEUTIC EXERCISES: CPT | Mod: GP

## 2024-02-13 PROCEDURE — 97161 PT EVAL LOW COMPLEX 20 MIN: CPT | Mod: GP

## 2024-02-13 NOTE — PROGRESS NOTES
Physical Therapy  Physical Therapy Orthopedic Evaluation    Patient Name: Mandy Taylor  MRN: [id number]  Today's Date: 2/13/2024  Time Calculation  Start Time: 1655  Stop Time: 1735  Time Calculation (min): 40 min  PT Evaluation Time Entry  PT Evaluation (Low) Time Entry: 25 PT Therapeutic Procedures Time Entry  Therapeutic Exercise Time Entry: 10          Insurance:  Visit number: 1 of 20  Authorization info: none  Insurance Type: Payor: ANTHEM / Plan: ANTHEM HMP / Product Type: *No Product type* /       CPT Codes: 86584 TE, 93858 MT, 88397 NM-WILBER, 66907 GAIT, 61260 STIM, 99013 SELF CARE    Current Problem  1. Arthritis of right hip  Referral to Physical Therapy    Follow Up In Physical Therapy      2. Greater trochanteric pain syndrome of right lower extremity  Referral to Physical Therapy    Follow Up In Physical Therapy          Referred by: Dr. Jim Blank    General:     Bilateral hip pain  Precautions:     Hx of left knee reconstruction, HTN CKD, polycystic KD  Medical History Form: Reviewed (scanned into chart)    Subjective:     Chief Complaint: Patient  is a 59 yo female who presents with bilateral leg pain and right hip pain.  Imaging studies demonstrate arthritic changes of bilateral hips on the setting of hip dysplasia, even though right hip is the only one symptomatic.   Onset: increase since the fall  ESTRADA: unknown        Pain:     Location: groin hip pain, achiness   Description: average pain in the hip 8/10 ( night), day pain 3/10  Sleeping on back, unable to sleep  Aggravating Factors:  Standing, Walking, and Squatting, sleeping on the side, stairs if doing to many  Relieving Factors:  change of position, medication, Ibuprofen     Relevant Information (PMH & Previous Tests/Imaging): Doppler:  negative  both sides    FINDINGS:  Five views bilateral hips:  There is no fracture or dislocation.      There is mild bilateral hip osteoarthritis. There is spurring along  the superolateral aspect of  the acetabula with preservation of the  joint space. There is sclerosis, spurring and subchondral cyst  formation of the symphysis pubis consistent with osteitis condensans  pubis.      IMPRESSION:  Mild bilateral hip osteoarthritis..  Previous Interventions/Treatments: None    Prior Level of Function (PLOF)  Patient previously independent with all ADLs  Exercise/Physical Activity: exercise at home, TDM, bike   Work/School:    Wants to avoid hip injection if can     Patients Living Environment: Reviewed and no concern  Primary Language: English  Patient's Goal(s) for Therapy: strengthen, decrease pain     Red Flags: Do you have any of the following? No  Fever/chills, unexplained weight changes, dizziness/fainting, unexplained change in bowel or bladder functions, unexplained malaise or muscle weakness, night pain/sweats, numbness or tingling    Objective:  Objective         Posture: slightly rounded posture, genu valgum,     Lower Extremity Functional Movements  Transfers: increase in use of the UE for sit to stand  Gait: sit to stand, first few steps are guided, stiff on the right side, decrease in right hip extension on stance, mild antalgic gait, increase in lateral sway  SL Balance: right SLS unable to perform, left SLS < 5 secs   Sit to stand:  dynamic valgus, decrease in weight shift over the right LE, increase in anterior trunk lean, decrease in eccentric quad control   SL Squat: unable to perform at this time    Right hip mobility   Pure flexion no pain WFL, scaption:  mild discomfort into resistance ( WFL  Hip IR:  PROM, 25 degrees mild into resistance  Hip ER: PROM:  20 degrees moderate into resistance  Knee flexion/ext:  WFL    Left hip mobility:  - no pain with flexion or scaption at the hip   Hip ER:  PROM:  35 degrees no pain  Hip IR:  PROM:  40 degrees no pain  Knee flexion/ext WFL      Hip flex:  3+/5 right and left 4-5/  Hip ext:  3+/5 right and left  Hip abd:  3+/5 right and left  Hip  add:  4-/5 right and left  Knee flexion/ext:  4+/5 bilateral     Core :  poor     LE flexibility:  clear    Mild/moderate TTP over the right greater trochanteric region,   Moderate tightness over the anterior hip and hip adductors    Outcome Measures:  Other Measures  Lower Extremity Funtional Score (LEFS): 60/80     Treatments:  Access Code: [email or web address]  URL: [email or web address]  Date: 02/13/2024  Prepared by: Mary Dawson    Exercises  - Hooklying Single Knee to Chest  - 4 x weekly - 1-3 sets - 3-5 reps - 30 hold  - Supine Lower Trunk Rotation  - 1 x weekly - 1-2 sets  - Bent Knee Fallouts  - 4 x weekly - 1-3 sets - 12-15 reps  - Hooklying Isometric Hip Abduction with Belt  - 4 x weekly - 1-3 sets - 12-15 reps - 10 hold    Review of joint guidelines, positions to avoid, activities to avoid    EDUCATION: Home exercise program, plan of care, activity modifications, pain management, and injury pathology       Assessment: Patient presents with signs and symptoms consistent with right hip pain secondary to right hip OA/degenerative changes, resulting in limited participation in pain-free ADLs and inability to perform at their prior level of function. Pt would benefit from physical therapy to address the impairments found & listed previously in the objective section in order to return to safe and pain-free ADLs and prior level of function.     Problem list: decrease in right hip mobility, soft tissue restriction of the right hip ( hip flex/adductors, deep glutes), decrease in LE strength, decrease in core/trunk stability, decrease in static and dynamic balance, change in gait  Plan:     Planned Interventions include: therapeutic exercise, self-care home management, manual therapy, therapeutic activities, gait training, neuromuscular coordination, vasopneumatic, dry needling, aquatic therapy  Frequency: 1 x Week  Duration: 8 Weeks  Goals: Set and discussed today     Patient  will reports a  decrease in right hip pain to < 2/10 with light ADL's.   Patient  will be able to ambulate > 20 with a purposeful walk without increase in hip reactivity.   Patient  will improve hip abductor strength of the right hip by > 1/2 MMT grade.   Patient  will passive hip ER mobility by > 5 degrees to decrease stress/strain on the LE.   Pt will be independent with HEP  Patient  will improve LEFS by > 5 points.   Plan of care was developed with input and agreement by the patient      Mary Dawson PT

## 2024-02-19 ENCOUNTER — NUTRITION (OUTPATIENT)
Dept: GASTROENTEROLOGY | Facility: HOSPITAL | Age: 59
End: 2024-02-19
Payer: COMMERCIAL

## 2024-02-19 VITALS — HEIGHT: 63 IN | BODY MASS INDEX: 27.72 KG/M2

## 2024-02-19 DIAGNOSIS — Q61.2 AUTOSOMAL DOMINANT POLYCYSTIC KIDNEY DISEASE: ICD-10-CM

## 2024-02-19 DIAGNOSIS — D50.8 OTHER IRON DEFICIENCY ANEMIA: ICD-10-CM

## 2024-02-19 DIAGNOSIS — Z71.3 DIETARY COUNSELING AND SURVEILLANCE: Primary | ICD-10-CM

## 2024-02-19 DIAGNOSIS — I10 BENIGN ESSENTIAL HYPERTENSION: ICD-10-CM

## 2024-02-19 PROCEDURE — 97802 MEDICAL NUTRITION INDIV IN: CPT

## 2024-02-19 NOTE — PROGRESS NOTES
Nutrition: Initial Assessment    Reason for Nutrition Visit: Patient is a 58 y.o. female referred for polycystic kidney disease, HTN, and SONU. Referred on 1/2/24 by Dr. Martha Connell.     Nutrition Assessment         Dietary Recall:                                                                        Alcohol:   Dietary Supplements:  Food Insecurity:   Self-Identified Challenges:    Physical Activity:     Labs:  Lab Results   Component Value Date    HGBA1C 5.8 (A) 09/18/2023    HGBA1C 5.7 (A) 10/12/2021     09/18/2023    K 3.8 09/18/2023     09/18/2023    CO2 31 09/18/2023    BUN 13 09/18/2023    CREATININE 0.92 09/18/2023    CALCIUM 9.7 09/18/2023    ALBUMIN 4.2 09/18/2023    PROT 7.8 09/18/2023    BILITOT 0.4 09/18/2023    ALKPHOS 93 09/18/2023    ALT 16 09/18/2023    AST 20 09/18/2023    GLUCOSE 94 09/18/2023    CHOL 201 (H) 11/08/2022    TRIG 61 11/08/2022    HDL 76.1 11/08/2022     Comments:     CMP:  Lipid panel:  Vitamin D:  A1c     Diabetes:  Diagnosed:  Prior Nutrition Education:  SMBG:  Hypoglycemia:     Current DM Medications/Insulin Regimen:    Nutrition Focused Physical Exam:    {Performed/Deferred:71748}    Muscle Wasting:                   Loss of Subcutaneous Fat:                Other Physical Findings:                   Past Medical History:  Patient Active Problem List   Diagnosis    Allergic rhinitis    Aneurysm, cerebral    Autosomal dominant polycystic kidney disease    Benign essential hypertension    Vitamin D deficiency    GERD (gastroesophageal reflux disease)    Abscess of liver    CKD (chronic kidney disease) stage 2, GFR 60-89 ml/min    Calculus of kidney    Cystic disease of liver    Hypokalemia    Nontoxic goiter    Personal history of nicotine dependence    Anemia    Bilateral impacted cerumen    Blepharitis, ulcerative    Calcifying tendinitis of left shoulder    Calcific tendinitis of right shoulder    Dry eye syndrome of bilateral lacrimal glands    Urinary frequency     "Hemorrhagic ovarian cyst    History of paroxysmal supraventricular tachycardia    Scapular dyskinesis    Segmental and somatic dysfunction of cervical region    Annual physical exam    Arthritis of right hip    Greater trochanteric pain syndrome of right lower extremity        Anthropometrics:  Ht Readings from Last 1 Encounters:   11/07/23 1.588 m (5' 2.5\")     BMI Readings from Last 1 Encounters:   11/07/23 27.72 kg/m²     Wt Readings from Last 10 Encounters:   11/07/23 69.9 kg (154 lb)   10/13/23 71.7 kg (158 lb)   09/18/23 70.8 kg (156 lb)   06/29/23 71.7 kg (158 lb)   06/28/23 71.9 kg (158 lb 7 oz)   03/17/23 68 kg (150 lb)   03/02/23 67.7 kg (149 lb 4 oz)   03/01/23 67.1 kg (148 lb)   02/15/23 68 kg (150 lb)   02/13/23 67.6 kg (149 lb 0.5 oz)       Weight changes:     Estimated Energy Needs:                     Nutrition Diagnosis                                                             Nutrition Interventions/Recommendations                      Nutrition Education    *Patient expressed understanding of the nutrition education and denied any additional questions/concerns.     {Educational Handouts:86822:x::1}    Nutrition Monitoring and Evaluation                                  {Readiness to Change (Optional):34107}  {Level of Understanding (Optional):14751}  {Anticipated Compliant (Optional):24842}     Follow-up: {RTC:09593}    "

## 2024-02-19 NOTE — PROGRESS NOTES
Nutrition: Initial Assessment    Reason for Nutrition Visit: Patient is a 58 y.o. female referred for polycystic kidney disease, HTN, and SONU.     Nutrition Assessment    Food and Nutrient History: Pt presents in office for nutrition counseling. Grows her own herbs.    Dietary Recall:  Meal 1: yogurt, banana  Meal 2: salad - lettuce, carrots, beans  Meal 3: apple // sometimes will have wild rice and chicken  Fluid Intake: water    Food Allergy: none  Food Intolerance: none  GI Symptoms: none    Oral Problems: denies  Dentition: own  Cooking: Patient  Grocery Shopping: Patient  Alcohol: none  Dietary Supplements: prenatal supplement  Food Insecurity: Denies     Physical Activity: Walking & exercise bike     Labs:  Lab Results   Component Value Date    HGBA1C 5.8 (A) 09/18/2023    HGBA1C 5.7 (A) 10/12/2021     09/18/2023    K 3.8 09/18/2023     09/18/2023    CO2 31 09/18/2023    BUN 13 09/18/2023    CREATININE 0.92 09/18/2023    CALCIUM 9.7 09/18/2023    ALBUMIN 4.2 09/18/2023    PROT 7.8 09/18/2023    BILITOT 0.4 09/18/2023    ALKPHOS 93 09/18/2023    ALT 16 09/18/2023    AST 20 09/18/2023    GLUCOSE 94 09/18/2023    CHOL 201 (H) 11/08/2022    TRIG 61 11/08/2022    HDL 76.1 11/08/2022   Comments: A1c = 5.8% suggestive of pre-DM (9/18/23). Elevated LDL = 113 (11/8/22). Vitamin D = normal (9/18/23).       Past Medical History:  Patient Active Problem List   Diagnosis    Allergic rhinitis    Aneurysm, cerebral    Autosomal dominant polycystic kidney disease    Benign essential hypertension    Vitamin D deficiency    GERD (gastroesophageal reflux disease)    Abscess of liver    CKD (chronic kidney disease) stage 2, GFR 60-89 ml/min    Calculus of kidney    Cystic disease of liver    Hypokalemia    Nontoxic goiter    Personal history of nicotine dependence    Anemia    Bilateral impacted cerumen    Blepharitis, ulcerative    Calcifying tendinitis of left shoulder    Calcific tendinitis of right shoulder    Dry  "eye syndrome of bilateral lacrimal glands    Urinary frequency    Hemorrhagic ovarian cyst    History of paroxysmal supraventricular tachycardia    Scapular dyskinesis    Segmental and somatic dysfunction of cervical region    Annual physical exam    Arthritis of right hip    Greater trochanteric pain syndrome of right lower extremity        Anthropometrics:  Ht Readings from Last 1 Encounters:   02/19/24 1.588 m (5' 2.5\")     BMI Readings from Last 1 Encounters:   02/19/24 27.72 kg/m²     Wt Readings from Last 10 Encounters:   11/07/23 69.9 kg (154 lb)   10/13/23 71.7 kg (158 lb)   09/18/23 70.8 kg (156 lb)   06/29/23 71.7 kg (158 lb)   06/28/23 71.9 kg (158 lb 7 oz)   03/17/23 68 kg (150 lb)   03/02/23 67.7 kg (149 lb 4 oz)   03/01/23 67.1 kg (148 lb)   02/15/23 68 kg (150 lb)   02/13/23 67.6 kg (149 lb 0.5 oz)       Estimated Energy Needs:    Total Energy Estimated Needs (kCal): 1500 kCal   Method for Estimating Needs: MSJ 1235 x 1.2   Total Protein Estimated Needs (g): 42 g   Total Protein Estimated Needs (g/kg): 0.6 g/kg    Nutrition Diagnosis     Patient has Malnutrition Diagnosis: No          Patient has Nutrition Diagnosis: Yes Diagnosis Status (1): New  Nutrition Diagnosis 1: Food and nutrition related knowledge deficit Related to (1): limited prior nutrition education for prediabetes and polycystic kidney disease As Evidenced by (1): patient reports limited prior nutrition education                                   Nutrition Interventions/Recommendations   Meals & Snacks: Carbohydrate-modified diet, mineral-modified diet (low sodium), modify schedule of foods/fluids (aim for 3 consistent meals), protein-modified diet.     Nutrition Education  Provided education on what happens in the body when prediabetes and diabetes develop. Explained why providing consistent amounts of carbohydrates in the diet is helpful for regulating blood sugar. Encouraged choosing foods that contain minimally processed complex " carbohydrates, such as high fiber whole grains, beans, starchy vegetables, whole fruits. Simple sugars from fruit juice, sugar-sweetened beverages, and foods with added sugar should be limited in the diet. Also discussed how foods like protein, some fat, and non-starchy vegetables impact blood sugar regulation.   Provided education on Plate Method as a tool for preparing balanced meals. Discussed the following: Fill 1/2 plate with non-starchy vegetables (broccoli, carrots, cauliflower, salad greens, cucumbers, tomatoes). These foods contribute very few carbohydrates, and they add fiber to the meal. Fill 1/4 plate with lean protein (meat, turkey, fish, seafood, eggs, nuts, cheese, cottage cheese, nut butter, tofu, edamame). Fill 1/4 plate with carbohydrates/starches (grains, fruits, starchy vegetables, beans, yogurt, milk). Aim for at least half of daily grains as whole grains.  Discussed recommendations for protein --> Protein is an important part of a healthy diet; however, too much protein is not good for kidney disease. Examples of proteins are chicken, turkey, beef, pork, fish, shellfish, eggs, cheese. Plant forms of protein include nuts, seeds, beans, and soy. Aim to limit protein to about ¼ of the plate, or about the size of a deck of cards.  Discussed limiting sodium and making sure not to use salt substitutes that replace sodium chloride with potassium chloride. Instead, use herbs/spices to flavor foods. Provided ideas.   Discussed nutrition therapy for increasing iron in the diet. Talked about the following:  Discussed forms of iron that are best absorbed.  Encouraged patient to pair sources of vitamin C with iron containing plant foods. Provided list of options.  Discussed avoiding coffee and tea with meals and snacks.  Talked about sources iron fortified foods (bran flakes)  Provided list of iron content in foods.  Provided example meal plan for increasing iron in the diet.     *Patient expressed  understanding of the nutrition education and denied any additional questions/concerns.     Educational Handouts: Keys for a Healthy Lifestyle, NCM for CKD, MNT for SONU (from [place])    Nutrition Monitoring and Evaluation   Weight maintenance  PO intake > 75% estimated energy needs  Reduced A1c < 5.7%  Reduced LDL < 100       Readiness to Change : Excellent  Level of Understanding : Excellent  Anticipated Compliant : Excellent     Follow-up: Patient is welcome to follow-up at any time. To schedule, please call [phone].

## 2024-02-20 ENCOUNTER — HOSPITAL ENCOUNTER (OUTPATIENT)
Dept: RADIOLOGY | Facility: CLINIC | Age: 59
Discharge: HOME | End: 2024-02-20

## 2024-02-20 DIAGNOSIS — I70.0 AORTIC ATHEROSCLEROSIS (CMS-HCC): ICD-10-CM

## 2024-02-20 PROCEDURE — 75571 CT HRT W/O DYE W/CA TEST: CPT

## 2024-02-21 ENCOUNTER — TELEPHONE (OUTPATIENT)
Dept: CARDIOLOGY | Facility: CLINIC | Age: 59
End: 2024-02-21

## 2024-02-21 NOTE — TELEPHONE ENCOUNTER
TCT patient and results reviewed. All questions answered. States understanding and agreement with treatment plan

## 2024-02-29 ENCOUNTER — TREATMENT (OUTPATIENT)
Dept: PHYSICAL THERAPY | Facility: CLINIC | Age: 59
End: 2024-02-29
Payer: COMMERCIAL

## 2024-02-29 DIAGNOSIS — M16.11 ARTHRITIS OF RIGHT HIP: ICD-10-CM

## 2024-02-29 DIAGNOSIS — M25.551 GREATER TROCHANTERIC PAIN SYNDROME OF RIGHT LOWER EXTREMITY: ICD-10-CM

## 2024-02-29 PROCEDURE — 97110 THERAPEUTIC EXERCISES: CPT | Mod: GP

## 2024-02-29 PROCEDURE — 97140 MANUAL THERAPY 1/> REGIONS: CPT | Mod: GP

## 2024-02-29 NOTE — PROGRESS NOTES
"  Physical Therapy  Physical Therapy Treatment Note    Patient Name: Mandy Taylor  MRN: [id number]  Today's Date: 2/29/2024  Time Calculation  Start Time: 1630  Stop Time: 1715  Time Calculation (min): 45 min  PT Therapeutic Procedures Time Entry  Manual Therapy Time Entry: 35  Therapeutic Exercise Time Entry: 10        Insurance:  Visit number: 2 of 20  Authorization info: none   Insurance Type: Payor: ANTHEM / Plan: ANTHEM HMP / Product Type: *No Product type* /   Current Problem  1. Arthritis of right hip  Follow Up In Physical Therapy      2. Greater trochanteric pain syndrome of right lower extremity  Follow Up In Physical Therapy        General  59 yo female who presents with bilateral leg pain and right hip pain.     Precautions  Hx of left knee reconstruction, HTN CKD, polycystic KD     Subjective:   Subjective   Patient reports her Right hip continues to hurt. She has been trying to perform her HEP, but it causes her a lot of pain afterwards.     Pain  6/10 Right groin     Objective:   Objective   Palpation: Hypertonicity and pain Right TFL, rectus femoris, quadriceps  Hip IR at 0: Impaired 75%    Treatments:   THERE EX  10 min  Prone knee flexion (for dynamic quad stretch) x 15  Prone knee IR/ER x 20   Hooklying bent knee fallout x 10 - discontinued, painful  Modified david stretch 10\" x 10    MANUAL  35 min  Prone hip IR PROM  Prone knee flexion for quad stretch  Supine hip inferior glide with mob belt  Supine hip lateral glide with mob belt  Supine STM to Right TFL, rectus femoris, quadriceps    NMRE    THERE ACT    HEP  Replace HEP issued at [facility] with this program:  Prone knee flexion (for dynamic quad stretch) x 15  Prone knee IR/ER x 20   Modified david stretch 10\" x 10    Assessment:  Visit focused on manual therapy to improve Right hip joint and soft tissue mobility. Very restricted in hip IR at 0 and anterior hip musculature. Good tolerance to manual therapy with reduction in symptoms noted " post treatment. Issued new HEP due to poor tolerance to HEP issued at evaluation.     Plan:   Assess response to new HEP, continue with manual therapy, consider dry needling TFL     Goals:   Patient  will reports a decrease in right hip pain to < 2/10 with light ADL's.   Patient  will be able to ambulate > 20 with a purposeful walk without increase in hip reactivity.   Patient  will improve hip abductor strength of the right hip by > 1/2 MMT grade.   Patient  will passive hip ER mobility by > 5 degrees to decrease stress/strain on the LE.   Pt will be independent with HEP  Patient  will improve LEFS by > 5 points.   Plan of care was developed with input and agreement by the patient    Evon Gibson, PT

## 2024-03-01 ENCOUNTER — CLINICAL SUPPORT (OUTPATIENT)
Dept: PRIMARY CARE | Facility: CLINIC | Age: 59
End: 2024-03-01
Payer: COMMERCIAL

## 2024-03-01 DIAGNOSIS — Z23 NEED FOR VACCINATION: ICD-10-CM

## 2024-03-01 PROCEDURE — 90750 HZV VACC RECOMBINANT IM: CPT | Performed by: FAMILY MEDICINE

## 2024-03-01 PROCEDURE — 90471 IMMUNIZATION ADMIN: CPT | Performed by: FAMILY MEDICINE

## 2024-03-19 NOTE — PROGRESS NOTES
"  Physical Therapy  Physical Therapy Treatment Note    Patient Name: Mandy Taylor  MRN: [id number]  Today's Date: 3/21/2024  Time Calculation  Start Time: 1645  Stop Time: 1730  Time Calculation (min): 45 min  PT Therapeutic Procedures Time Entry  Manual Therapy Time Entry: 15  Therapeutic Exercise Time Entry: 30        Insurance:  Visit number: 3 of 20  Authorization info: none   Insurance Type: Payor: ANTHEM / Plan: ANTHEM HMP / Product Type: *No Product type* /   Current Problem  1. Arthritis of right hip  Follow Up In Physical Therapy      2. Greater trochanteric pain syndrome of right lower extremity  Follow Up In Physical Therapy          General  59 yo female who presents with bilateral leg pain and right hip pain.     Precautions  Hx of left knee reconstruction, HTN CKD, polycystic KD     Subjective:   Subjective   Pt reports that after she sits for a while at night and at night sleeping and she has to get up she will have this posterior hip pain. She reports groin is better    Pain  6/10 Right groin     Objective:   Objective   Palpation: Hypertonicity and pain Right TFL, rectus femoris, quadriceps  Hip IR at 0: Impaired 75%    Treatments:   THERE EX x 30 min   Bike x 8 min   Ball roll forward with SB hold 5 secs x 10 reps  Right PROM of the right hip IR/ER/flexion   Prone knee flexion (for dynamic quad stretch) x 15  Prone knee IR/ER x 20   Prone quad stretch right x 30 secs x 3  Prone hip IR/ER PROM   Supine hip IR/ER with ball under the knee x 2 min   Prone ball between heels with glute set x 6 secs x 12 reps  Hip abd with bridge x 12 reps     MANUAL x 15 min   Long lever decompression   Prone knee flexion for quad stretch  Supine hip inferior glide with mob belt  Supine hip lateral glide with mob belt      NMRE    THERE ACT    HEP  Replace HEP issued at [facility] with this program:  Prone knee flexion (for dynamic quad stretch) x 15  Prone knee IR/ER x 20   Modified david stretch 10\" x 10    Assessment: "   Pt making good progress towards all goals, demonstrated increased mobility and strength with increased tolerance for therapeutic exercise. Continue to focus on mobility of the right hip and strength of the posterior chain     Plan:      Goals:   Patient  will reports a decrease in right hip pain to < 2/10 with light ADL's.   Patient  will be able to ambulate > 20 with a purposeful walk without increase in hip reactivity.   Patient  will improve hip abductor strength of the right hip by > 1/2 MMT grade.   Patient  will passive hip ER mobility by > 5 degrees to decrease stress/strain on the LE.   Pt will be independent with HEP  Patient  will improve LEFS by > 5 points.   Plan of care was developed with input and agreement by the patient    Mary Dawson, PT

## 2024-03-21 ENCOUNTER — TREATMENT (OUTPATIENT)
Dept: PHYSICAL THERAPY | Facility: CLINIC | Age: 59
End: 2024-03-21
Payer: COMMERCIAL

## 2024-03-21 DIAGNOSIS — M25.551 GREATER TROCHANTERIC PAIN SYNDROME OF RIGHT LOWER EXTREMITY: ICD-10-CM

## 2024-03-21 DIAGNOSIS — M16.11 ARTHRITIS OF RIGHT HIP: ICD-10-CM

## 2024-03-21 PROCEDURE — 97110 THERAPEUTIC EXERCISES: CPT | Mod: GP

## 2024-03-21 PROCEDURE — 97140 MANUAL THERAPY 1/> REGIONS: CPT | Mod: GP

## 2024-03-26 ENCOUNTER — APPOINTMENT (OUTPATIENT)
Dept: PRIMARY CARE | Facility: CLINIC | Age: 59
End: 2024-03-26
Payer: COMMERCIAL

## 2024-04-02 ENCOUNTER — TREATMENT (OUTPATIENT)
Dept: PHYSICAL THERAPY | Facility: CLINIC | Age: 59
End: 2024-04-02
Payer: COMMERCIAL

## 2024-04-02 DIAGNOSIS — M16.11 ARTHRITIS OF RIGHT HIP: ICD-10-CM

## 2024-04-02 DIAGNOSIS — M25.551 GREATER TROCHANTERIC PAIN SYNDROME OF RIGHT LOWER EXTREMITY: ICD-10-CM

## 2024-04-02 PROCEDURE — 97535 SELF CARE MNGMENT TRAINING: CPT | Mod: GP

## 2024-04-02 PROCEDURE — 97110 THERAPEUTIC EXERCISES: CPT | Mod: GP

## 2024-04-02 NOTE — PROGRESS NOTES
"  Physical Therapy  Physical Therapy Treatment Note    Patient Name: Mandy Taylor  MRN: [id number]  Today's Date: 4/2/2024  Time Calculation  Start Time: 0800  Stop Time: 0845  Time Calculation (min): 45 min  PT Therapeutic Procedures Time Entry  Therapeutic Exercise Time Entry: 30  Self-Care/Home Mgmt Training: 15        Insurance:  Visit number: 4 of 20  Authorization info: none   Insurance Type: Payor: ANTHEM / Plan: ANTHEM HMP / Product Type: *No Product type* /   Current Problem  1. Arthritis of right hip  Follow Up In Physical Therapy      2. Greater trochanteric pain syndrome of right lower extremity  Follow Up In Physical Therapy          General  59 yo female who presents with bilateral leg pain and right hip pain.     Precautions  Hx of left knee reconstruction, HTN CKD, polycystic KD     Subjective:   Subjective   More night pain and more stiffness after sitting for greater amount of time    Pain  6/10 Right groin     Objective:   Objective   Palpation: Hypertonicity and pain Right TFL, rectus femoris, quadriceps  Hip IR at 0: Impaired 75%    Treatments:   THERE EX x 30 min   Bike x 6 min   - childpose x stretch 10 secs x 10 reps  Ball roll forward with SB hold 5 secs x 10 reps  Right PROM of the right hip IR/ER/flexion   Long lever pull, short lever hip pull   Reverse plank x 12 reps   - DKTC stretch x 25 reps  - belly brace hold with DKTC 10 reps  - clamshell x 12 reps   - discussion regarding hip dyplasia, sleeping guidelines, bursitis vs. Arthritis ( self care x 15 min)  - tendon loading for the glute med 45 secs on 1.5 secs off       NMRE    THERE ACT    HEP  Replace HEP issued at [facility] with this program:  Prone knee flexion (for dynamic quad stretch) x 15  Prone knee IR/ER x 20   Modified david stretch 10\" x 10    Assessment:   Pt was able to tolerate the session well. She had no increase in pain.  She had a better understanding of PT POC at the end of the session     Plan:      Continue to focus " on strength, mobilization and stretching of the right hip  Goals:   Patient  will reports a decrease in right hip pain to < 2/10 with light ADL's.   Patient  will be able to ambulate > 20 with a purposeful walk without increase in hip reactivity.   Patient  will improve hip abductor strength of the right hip by > 1/2 MMT grade.   Patient  will passive hip ER mobility by > 5 degrees to decrease stress/strain on the LE.   Pt will be independent with HEP  Patient  will improve LEFS by > 5 points.   Plan of care was developed with input and agreement by the patient    Mary Dawson, PT

## 2024-04-04 NOTE — PROGRESS NOTES
"  Physical Therapy  Physical Therapy Treatment Note    Patient Name: Mandy Taylor  MRN: [id number]  Today's Date: 4/9/2024  Time Calculation  Start Time: 1635  Stop Time: 1713  Time Calculation (min): 38 min  PT Therapeutic Procedures Time Entry  Manual Therapy Time Entry: 15  Therapeutic Exercise Time Entry: 23        Insurance:  Visit number: 5 of 20  Authorization info: none   Insurance Type: Payor: ANTHEM / Plan: ANTHEM HMP / Product Type: *No Product type* /   Current Problem  1. Arthritis of right hip  Follow Up In Physical Therapy      2. Greater trochanteric pain syndrome of right lower extremity  Follow Up In Physical Therapy            General  57 yo female who presents with bilateral leg pain and right hip pain.     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 Blank or possibly ortho to discuss next steps regarding her hip care and referral to ortho doc  Pain  6/10 Right groin     Objective:   Objective   Palpation: Hypertonicity and pain Right TFL, rectus femoris, quadriceps  Hip IR at 0: Impaired 75%    Treatments:     Bike x 10 min   - childpose x stretch 10 secs x 10 reps  Ball roll forward with SB hold 5 secs x 10 reps  Right PROM of the right hip IR/ER/flexion  Right hip opener stretch ER  Long lever pull, short lever hip pull of the right hip  Review of strengthening guidelines  Review of positions to avoid  Review of PT POC and assessment of right hip and discussion on the next step    Manual x 1  TE x 2     NMRE    THERE ACT    HEP  Replace HEP issued at [facility] with this program:  Prone knee flexion (for dynamic quad stretch) x 15  Prone knee IR/ER x 20   Modified david stretch 10\" x 10    Assessment:   Pt continues to have moderate to high irritability in the right hip. She " is having difficulty with all loaded activity and pain after work duties that will travel down into her lower leg. She does not want an injection to mask the pain.  At this time I feel a referral to ortho is beneficial to discuss with her her options regarding her right hip and what are her possible next steps for management     Plan:      Continue to focus on strength, mobilization and stretching of the right hip  Goals:   Patient  will reports a decrease in right hip pain to < 2/10 with light ADL's.   Patient  will be able to ambulate > 20 with a purposeful walk without increase in hip reactivity.   Patient  will improve hip abductor strength of the right hip by > 1/2 MMT grade.   Patient  will passive hip ER mobility by > 5 degrees to decrease stress/strain on the LE.   Pt will be independent with HEP  Patient  will improve LEFS by > 5 points.   Plan of care was developed with input and agreement by the patient    Mary Dawson, PT

## 2024-04-09 ENCOUNTER — TREATMENT (OUTPATIENT)
Dept: PHYSICAL THERAPY | Facility: CLINIC | Age: 59
End: 2024-04-09
Payer: COMMERCIAL

## 2024-04-09 DIAGNOSIS — M16.11 ARTHRITIS OF RIGHT HIP: Primary | ICD-10-CM

## 2024-04-09 DIAGNOSIS — M25.551 GREATER TROCHANTERIC PAIN SYNDROME OF RIGHT LOWER EXTREMITY: ICD-10-CM

## 2024-04-09 PROCEDURE — 97140 MANUAL THERAPY 1/> REGIONS: CPT | Mod: GP

## 2024-04-09 PROCEDURE — 97110 THERAPEUTIC EXERCISES: CPT | Mod: GP

## 2024-04-16 ENCOUNTER — APPOINTMENT (OUTPATIENT)
Dept: PHYSICAL THERAPY | Facility: CLINIC | Age: 59
End: 2024-04-16
Payer: COMMERCIAL

## 2024-04-23 ENCOUNTER — APPOINTMENT (OUTPATIENT)
Dept: PHYSICAL THERAPY | Facility: CLINIC | Age: 59
End: 2024-04-23
Payer: COMMERCIAL

## 2024-05-10 ENCOUNTER — LAB (OUTPATIENT)
Dept: LAB | Facility: LAB | Age: 59
End: 2024-05-10
Payer: COMMERCIAL

## 2024-05-10 DIAGNOSIS — R73.9 ELEVATED BLOOD SUGAR: ICD-10-CM

## 2024-05-10 DIAGNOSIS — I10 BENIGN ESSENTIAL HYPERTENSION: ICD-10-CM

## 2024-05-10 DIAGNOSIS — D64.9 ANEMIA, UNSPECIFIED TYPE: ICD-10-CM

## 2024-05-10 DIAGNOSIS — N18.2 CHRONIC KIDNEY DISEASE, STAGE 2 (MILD): ICD-10-CM

## 2024-05-10 DIAGNOSIS — Q61.3 POLYCYSTIC KIDNEY, UNSPECIFIED: ICD-10-CM

## 2024-05-10 DIAGNOSIS — I10 BENIGN ESSENTIAL HYPERTENSION: Primary | ICD-10-CM

## 2024-05-10 DIAGNOSIS — I10 ESSENTIAL (PRIMARY) HYPERTENSION: ICD-10-CM

## 2024-05-10 LAB
APPEARANCE UR: CLEAR
BILIRUB UR STRIP.AUTO-MCNC: NEGATIVE MG/DL
COLOR UR: ABNORMAL
CREAT UR-MCNC: 102.3 MG/DL (ref 20–320)
ERYTHROCYTE [DISTWIDTH] IN BLOOD BY AUTOMATED COUNT: 12 % (ref 11.5–14.5)
EST. AVERAGE GLUCOSE BLD GHB EST-MCNC: 108 MG/DL
GLUCOSE UR STRIP.AUTO-MCNC: NORMAL MG/DL
HBA1C MFR BLD: 5.4 %
HCT VFR BLD AUTO: 40.7 % (ref 36–46)
HGB BLD-MCNC: 12.8 G/DL (ref 12–16)
KETONES UR STRIP.AUTO-MCNC: NEGATIVE MG/DL
LEUKOCYTE ESTERASE UR QL STRIP.AUTO: ABNORMAL
MCH RBC QN AUTO: 29.4 PG (ref 26–34)
MCHC RBC AUTO-ENTMCNC: 31.4 G/DL (ref 32–36)
MCV RBC AUTO: 93 FL (ref 80–100)
MICROALBUMIN UR-MCNC: 16.6 MG/L
MICROALBUMIN/CREAT UR: 16.2 UG/MG CREAT
NITRITE UR QL STRIP.AUTO: NEGATIVE
NRBC BLD-RTO: 0 /100 WBCS (ref 0–0)
PH UR STRIP.AUTO: 6.5 [PH]
PLATELET # BLD AUTO: 206 X10*3/UL (ref 150–450)
PROT UR STRIP.AUTO-MCNC: NEGATIVE MG/DL
RBC # BLD AUTO: 4.36 X10*6/UL (ref 4–5.2)
RBC # UR STRIP.AUTO: NEGATIVE /UL
RBC #/AREA URNS AUTO: NORMAL /HPF
SP GR UR STRIP.AUTO: 1.01
SQUAMOUS #/AREA URNS AUTO: NORMAL /HPF
UROBILINOGEN UR STRIP.AUTO-MCNC: NORMAL MG/DL
WBC # BLD AUTO: 6.2 X10*3/UL (ref 4.4–11.3)
WBC #/AREA URNS AUTO: NORMAL /HPF

## 2024-05-10 PROCEDURE — 82570 ASSAY OF URINE CREATININE: CPT

## 2024-05-10 PROCEDURE — 81001 URINALYSIS AUTO W/SCOPE: CPT

## 2024-05-10 PROCEDURE — 82043 UR ALBUMIN QUANTITATIVE: CPT

## 2024-05-10 PROCEDURE — 83036 HEMOGLOBIN GLYCOSYLATED A1C: CPT

## 2024-05-10 PROCEDURE — 85027 COMPLETE CBC AUTOMATED: CPT

## 2024-05-10 PROCEDURE — 36415 COLL VENOUS BLD VENIPUNCTURE: CPT

## 2024-05-13 ENCOUNTER — TELEPHONE (OUTPATIENT)
Dept: PRIMARY CARE | Facility: CLINIC | Age: 59
End: 2024-05-13
Payer: COMMERCIAL

## 2024-05-13 DIAGNOSIS — K21.9 GASTROESOPHAGEAL REFLUX DISEASE WITHOUT ESOPHAGITIS: ICD-10-CM

## 2024-05-13 DIAGNOSIS — I10 BENIGN ESSENTIAL HYPERTENSION: ICD-10-CM

## 2024-05-14 RX ORDER — AMLODIPINE BESYLATE 5 MG/1
5 TABLET ORAL DAILY
Qty: 90 TABLET | Refills: 0 | Status: SHIPPED | OUTPATIENT
Start: 2024-05-14

## 2024-05-14 RX ORDER — OMEPRAZOLE 40 MG/1
40 CAPSULE, DELAYED RELEASE ORAL DAILY
Qty: 90 CAPSULE | Refills: 0 | Status: SHIPPED | OUTPATIENT
Start: 2024-05-14

## 2024-07-10 ENCOUNTER — LAB (OUTPATIENT)
Dept: LAB | Facility: LAB | Age: 59
End: 2024-07-10
Payer: COMMERCIAL

## 2024-07-10 ENCOUNTER — APPOINTMENT (OUTPATIENT)
Dept: PRIMARY CARE | Facility: CLINIC | Age: 59
End: 2024-07-10
Payer: COMMERCIAL

## 2024-07-10 VITALS
DIASTOLIC BLOOD PRESSURE: 72 MMHG | HEART RATE: 64 BPM | WEIGHT: 160 LBS | SYSTOLIC BLOOD PRESSURE: 112 MMHG | OXYGEN SATURATION: 96 % | BODY MASS INDEX: 28.8 KG/M2

## 2024-07-10 DIAGNOSIS — D50.8 OTHER IRON DEFICIENCY ANEMIA: ICD-10-CM

## 2024-07-10 DIAGNOSIS — Z11.59 ENCOUNTER FOR HEPATITIS C SCREENING TEST FOR LOW RISK PATIENT: ICD-10-CM

## 2024-07-10 DIAGNOSIS — K75.0 ABSCESS OF LIVER (HHS-HCC): ICD-10-CM

## 2024-07-10 DIAGNOSIS — Z01.84 IMMUNITY STATUS TESTING: ICD-10-CM

## 2024-07-10 DIAGNOSIS — I10 ESSENTIAL (PRIMARY) HYPERTENSION: ICD-10-CM

## 2024-07-10 DIAGNOSIS — Q61.3 POLYCYSTIC KIDNEY, UNSPECIFIED: ICD-10-CM

## 2024-07-10 DIAGNOSIS — Z11.3 ROUTINE SCREENING FOR STI (SEXUALLY TRANSMITTED INFECTION): ICD-10-CM

## 2024-07-10 DIAGNOSIS — G25.81 RESTLESS LEGS: ICD-10-CM

## 2024-07-10 DIAGNOSIS — Z00.00 ENCOUNTER FOR PREVENTATIVE ADULT HEALTH CARE EXAMINATION: ICD-10-CM

## 2024-07-10 DIAGNOSIS — Q61.2 AUTOSOMAL DOMINANT POLYCYSTIC KIDNEY DISEASE: ICD-10-CM

## 2024-07-10 DIAGNOSIS — Z01.84 IMMUNITY STATUS TESTING: Primary | ICD-10-CM

## 2024-07-10 DIAGNOSIS — E04.9 NONTOXIC GOITER: ICD-10-CM

## 2024-07-10 DIAGNOSIS — I10 BENIGN ESSENTIAL HYPERTENSION: ICD-10-CM

## 2024-07-10 DIAGNOSIS — N18.2 CHRONIC KIDNEY DISEASE, STAGE 2 (MILD): Primary | ICD-10-CM

## 2024-07-10 DIAGNOSIS — I67.1 ANEURYSM, CEREBRAL (HHS-HCC): ICD-10-CM

## 2024-07-10 PROBLEM — M99.01 SEGMENTAL AND SOMATIC DYSFUNCTION OF CERVICAL REGION: Status: RESOLVED | Noted: 2023-10-12 | Resolved: 2024-07-10

## 2024-07-10 PROBLEM — M16.11 ARTHRITIS OF RIGHT HIP: Status: RESOLVED | Noted: 2024-01-16 | Resolved: 2024-07-10

## 2024-07-10 PROBLEM — E87.6 HYPOKALEMIA: Status: RESOLVED | Noted: 2023-02-11 | Resolved: 2024-07-10

## 2024-07-10 PROBLEM — D64.9 ANEMIA: Status: RESOLVED | Noted: 2023-10-12 | Resolved: 2024-07-10

## 2024-07-10 PROBLEM — R35.0 URINARY FREQUENCY: Status: RESOLVED | Noted: 2023-10-12 | Resolved: 2024-07-10

## 2024-07-10 PROBLEM — H61.23 BILATERAL IMPACTED CERUMEN: Status: RESOLVED | Noted: 2023-10-12 | Resolved: 2024-07-10

## 2024-07-10 PROBLEM — G25.89 SCAPULAR DYSKINESIS: Status: RESOLVED | Noted: 2023-10-12 | Resolved: 2024-07-10

## 2024-07-10 LAB
25(OH)D3 SERPL-MCNC: 37 NG/ML (ref 30–100)
ALBUMIN SERPL BCP-MCNC: 4.4 G/DL (ref 3.4–5)
ALP SERPL-CCNC: 101 U/L (ref 33–110)
ALT SERPL W P-5'-P-CCNC: 16 U/L (ref 7–45)
ANION GAP SERPL CALC-SCNC: 13 MMOL/L (ref 10–20)
AST SERPL W P-5'-P-CCNC: 19 U/L (ref 9–39)
BASOPHILS # BLD AUTO: 0.04 X10*3/UL (ref 0–0.1)
BASOPHILS NFR BLD AUTO: 0.7 %
BILIRUB SERPL-MCNC: 0.5 MG/DL (ref 0–1.2)
BUN SERPL-MCNC: 17 MG/DL (ref 6–23)
CALCIUM SERPL-MCNC: 9.9 MG/DL (ref 8.6–10.6)
CHLORIDE SERPL-SCNC: 101 MMOL/L (ref 98–107)
CHOLEST SERPL-MCNC: 217 MG/DL (ref 0–199)
CHOLESTEROL/HDL RATIO: 2.7
CO2 SERPL-SCNC: 32 MMOL/L (ref 21–32)
CREAT SERPL-MCNC: 0.95 MG/DL (ref 0.5–1.05)
CREAT UR-MCNC: 187.1 MG/DL (ref 20–320)
EGFRCR SERPLBLD CKD-EPI 2021: 69 ML/MIN/1.73M*2
EOSINOPHIL # BLD AUTO: 0.05 X10*3/UL (ref 0–0.7)
EOSINOPHIL NFR BLD AUTO: 0.8 %
ERYTHROCYTE [DISTWIDTH] IN BLOOD BY AUTOMATED COUNT: 12.1 % (ref 11.5–14.5)
EST. AVERAGE GLUCOSE BLD GHB EST-MCNC: 114 MG/DL
FERRITIN SERPL-MCNC: 59 NG/ML (ref 8–150)
GLUCOSE SERPL-MCNC: 105 MG/DL (ref 74–99)
HBA1C MFR BLD: 5.6 %
HBV SURFACE AB SER-ACNC: <3.1 MIU/ML
HBV SURFACE AG SERPL QL IA: NONREACTIVE
HCT VFR BLD AUTO: 46.5 % (ref 36–46)
HCV AB SER QL: NONREACTIVE
HDLC SERPL-MCNC: 79.1 MG/DL
HGB BLD-MCNC: 14.9 G/DL (ref 12–16)
HIV 1+2 AB+HIV1 P24 AG SERPL QL IA: NONREACTIVE
IMM GRANULOCYTES # BLD AUTO: 0.01 X10*3/UL (ref 0–0.7)
IMM GRANULOCYTES NFR BLD AUTO: 0.2 % (ref 0–0.9)
IRON SATN MFR SERPL: 20 % (ref 25–45)
IRON SERPL-MCNC: 77 UG/DL (ref 35–150)
LDLC SERPL CALC-MCNC: 116 MG/DL
LYMPHOCYTES # BLD AUTO: 1.36 X10*3/UL (ref 1.2–4.8)
LYMPHOCYTES NFR BLD AUTO: 22.4 %
MCH RBC QN AUTO: 30.2 PG (ref 26–34)
MCHC RBC AUTO-ENTMCNC: 32 G/DL (ref 32–36)
MCV RBC AUTO: 94 FL (ref 80–100)
MICROALBUMIN UR-MCNC: 26.2 MG/L
MICROALBUMIN/CREAT UR: 14 UG/MG CREAT
MONOCYTES # BLD AUTO: 0.24 X10*3/UL (ref 0.1–1)
MONOCYTES NFR BLD AUTO: 3.9 %
NEUTROPHILS # BLD AUTO: 4.38 X10*3/UL (ref 1.2–7.7)
NEUTROPHILS NFR BLD AUTO: 72 %
NON HDL CHOLESTEROL: 138 MG/DL (ref 0–149)
NRBC BLD-RTO: 0 /100 WBCS (ref 0–0)
PLATELET # BLD AUTO: 270 X10*3/UL (ref 150–450)
POTASSIUM SERPL-SCNC: 3.9 MMOL/L (ref 3.5–5.3)
PROT SERPL-MCNC: 7.9 G/DL (ref 6.4–8.2)
RBC # BLD AUTO: 4.94 X10*6/UL (ref 4–5.2)
SODIUM SERPL-SCNC: 142 MMOL/L (ref 136–145)
TIBC SERPL-MCNC: 383 UG/DL (ref 240–445)
TREPONEMA PALLIDUM IGG+IGM AB [PRESENCE] IN SERUM OR PLASMA BY IMMUNOASSAY: NONREACTIVE
TRIGL SERPL-MCNC: 108 MG/DL (ref 0–149)
TSH SERPL-ACNC: 0.9 MIU/L (ref 0.44–3.98)
UIBC SERPL-MCNC: 306 UG/DL (ref 110–370)
VLDL: 22 MG/DL (ref 0–40)
WBC # BLD AUTO: 6.1 X10*3/UL (ref 4.4–11.3)

## 2024-07-10 PROCEDURE — 86780 TREPONEMA PALLIDUM: CPT

## 2024-07-10 PROCEDURE — 3078F DIAST BP <80 MM HG: CPT | Performed by: INTERNAL MEDICINE

## 2024-07-10 PROCEDURE — 83036 HEMOGLOBIN GLYCOSYLATED A1C: CPT

## 2024-07-10 PROCEDURE — 82043 UR ALBUMIN QUANTITATIVE: CPT

## 2024-07-10 PROCEDURE — 87591 N.GONORRHOEAE DNA AMP PROB: CPT

## 2024-07-10 PROCEDURE — 83550 IRON BINDING TEST: CPT

## 2024-07-10 PROCEDURE — 86706 HEP B SURFACE ANTIBODY: CPT

## 2024-07-10 PROCEDURE — 36415 COLL VENOUS BLD VENIPUNCTURE: CPT

## 2024-07-10 PROCEDURE — 82728 ASSAY OF FERRITIN: CPT

## 2024-07-10 PROCEDURE — 82306 VITAMIN D 25 HYDROXY: CPT

## 2024-07-10 PROCEDURE — 99213 OFFICE O/P EST LOW 20 MIN: CPT | Performed by: INTERNAL MEDICINE

## 2024-07-10 PROCEDURE — 82570 ASSAY OF URINE CREATININE: CPT

## 2024-07-10 PROCEDURE — 1036F TOBACCO NON-USER: CPT | Performed by: INTERNAL MEDICINE

## 2024-07-10 PROCEDURE — 87340 HEPATITIS B SURFACE AG IA: CPT

## 2024-07-10 PROCEDURE — 86803 HEPATITIS C AB TEST: CPT

## 2024-07-10 PROCEDURE — 84443 ASSAY THYROID STIM HORMONE: CPT

## 2024-07-10 PROCEDURE — 87491 CHLMYD TRACH DNA AMP PROBE: CPT

## 2024-07-10 PROCEDURE — 99396 PREV VISIT EST AGE 40-64: CPT | Performed by: INTERNAL MEDICINE

## 2024-07-10 PROCEDURE — 83540 ASSAY OF IRON: CPT

## 2024-07-10 PROCEDURE — 80053 COMPREHEN METABOLIC PANEL: CPT

## 2024-07-10 PROCEDURE — 87389 HIV-1 AG W/HIV-1&-2 AB AG IA: CPT

## 2024-07-10 PROCEDURE — 80061 LIPID PANEL: CPT

## 2024-07-10 PROCEDURE — 85025 COMPLETE CBC W/AUTO DIFF WBC: CPT

## 2024-07-10 PROCEDURE — 3074F SYST BP LT 130 MM HG: CPT | Performed by: INTERNAL MEDICINE

## 2024-07-10 RX ORDER — METOPROLOL TARTRATE 25 MG/1
TABLET, FILM COATED ORAL EVERY 12 HOURS
COMMUNITY
Start: 2021-12-30 | End: 2024-07-10 | Stop reason: WASHOUT

## 2024-07-10 RX ORDER — ACETAMINOPHEN 325 MG/1
TABLET ORAL EVERY 4 HOURS PRN
COMMUNITY
Start: 2022-12-21

## 2024-07-10 RX ORDER — POTASSIUM CITRATE 10 MEQ/1
TABLET, EXTENDED RELEASE ORAL
COMMUNITY
Start: 2018-06-22 | End: 2024-07-10 | Stop reason: WASHOUT

## 2024-07-10 RX ORDER — TRIAMTERENE/HYDROCHLOROTHIAZID 37.5-25 MG
TABLET ORAL
COMMUNITY
Start: 2021-08-03 | End: 2024-07-10 | Stop reason: WASHOUT

## 2024-07-10 RX ORDER — ASPIRIN 325 MG
TABLET, DELAYED RELEASE (ENTERIC COATED) ORAL
COMMUNITY
Start: 2016-11-03

## 2024-07-10 RX ORDER — KETOROLAC TROMETHAMINE 30 MG/ML
INJECTION, SOLUTION INTRAMUSCULAR; INTRAVENOUS
COMMUNITY
Start: 2022-07-17 | End: 2024-07-10 | Stop reason: WASHOUT

## 2024-07-10 RX ORDER — FAMOTIDINE 40 MG/1
TABLET, FILM COATED ORAL
COMMUNITY
Start: 2020-07-30 | End: 2024-07-10 | Stop reason: WASHOUT

## 2024-07-10 ASSESSMENT — PATIENT HEALTH QUESTIONNAIRE - PHQ9
1. LITTLE INTEREST OR PLEASURE IN DOING THINGS: NOT AT ALL
2. FEELING DOWN, DEPRESSED OR HOPELESS: NOT AT ALL
SUM OF ALL RESPONSES TO PHQ9 QUESTIONS 1 AND 2: 0

## 2024-07-10 ASSESSMENT — PAIN SCALES - GENERAL: PAINLEVEL: 0-NO PAIN

## 2024-07-10 NOTE — ASSESSMENT & PLAN NOTE
History of multiple small cerebral aneurysms noted, last imaging obtained in 2016  Was recommended for further workup or management but unclear and unable to locate those records  Will refer to neurosurgery for additional evaluation and management.  Orders:    Referral to Neurosurgery; Future    Follow Up In Advanced Primary Care - PCP - Established; Future

## 2024-07-10 NOTE — PROGRESS NOTES
Subjective   Patient ID: Mandy Taylor is a 58 y.o. female who presents for New Patient Visit and Annual Exam.  HPI  58-year-old female patient of Dr. Connell here for establishment of care and preventive care visit, last seen for preventive care visit in November.    Past medical history  - HTN - on amlodipine 5  - GERD - previously on PPI discontinued. Uses tums prn.   - Prediabetes-resolved - strong family history of diabetes.   - Polycystic kidney disease - followed by nephrology at the [facility] Dr. Mcgowan last seen in May recommended consideration for tolvaptan with MRI prior,  recommended consideration for aneurysmal screening  - Multiple small intracranial aneurysms - last imaging in 2016  - Polycystic liver disease - seen by Dr. Mathew last seen in June history of hepatic abscess   - Hip dysplasia - seen by Dr. Jim Blank s/p PT now improved.   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 Mo   - Dyslipidemia, mild atheromatous calcification - CAC 2/24 0   - Restless legs - recommended iron studies not yet performed and resolved.   - ?Hypothyroidism previously on levothyroxine, last TFTs showing slightly low TSH.     Social:   - Lives at home with  and 25 year old son  - Drives school van   - 4 children, 2 grandchildren one on the way, youngest 25   - No tobacco, alcohol or illicits    Lifestyle   - Diet - overall healthy diet, enjoys incorporating more vegetables   - Exercise - stationary bike 3x/week and [place]   - Sleep - well, in bed at 8pm and up at 4am. Interrupted by nocturia.   Current Outpatient Medications   Medication Instructions    acetaminophen (Tylenol) 325 mg tablet oral, Every 4 hours PRN    amLODIPine (NORVASC) 5 mg, oral, Daily    cetirizine (ZyrTEC) 10 mg chewable tablet oral, Daily    cholecalciferol (Vitamin D-3) 50,000 unit capsule oral    docosahexaenoic acid (PRENATAL DHA ORAL) 1 tablet, oral, Daily    fluticasone (Flonase) 50  mcg/actuation nasal spray 1 spray, Each Nostril, 2 times daily    omeprazole (PRILOSEC) 40 mg, oral, Daily        Objective     /72   Pulse 64   Wt 72.6 kg (160 lb)   SpO2 96%   BMI 28.80 kg/m²     Physical Exam  General: Awake, alert, appears stated age   Head/eyes/ears: NCAT, EOMI, PERRL, TM WNL, no cerumen  Throat: moist mucus membranes, no pharyngeal erythema  Neck: Supple, nontender, no lymphadenopathy, thyroid exam unremarkable   Breast exam: No palpable lumps, no discharge, no noted axillary lymphadenopathy, Chaperone offered and declined.  Heart: RRR, no murmurs, rubs or gallops  Lungs: CTA bilaterally, no rhonchi rales or wheezes   Abdomen: Soft, NT/ND  Extremities: No edema, 2+ DP pulses   Skin: No concerning skin lesions on visualized skin   Neuro: AAO x 3, no FND, gait unremarkable     Assessment/Plan   Problem List Items Addressed This Visit    None    Assessment & Plan  Aneurysm, cerebral ([facility])  History of multiple small cerebral aneurysms noted, last imaging obtained in 2016  Was recommended for further workup or management but unclear and unable to locate those records  Will refer to neurosurgery for additional evaluation and management.  Orders:    Referral to Neurosurgery; Future    Follow Up In Advanced Primary Care - PCP - Established; Future    Immunity status testing  Interested in screening for hepatitis B immunity and consideration for hepatitis B vaccination, may combine with hepatitis A given current risk factors.  Orders:    Hepatitis B Surface Antigen; Future    Hepatitis B Surface Antibody; Future    Encounter for preventative adult health care examination  Age-appropriate screening performed  Depression screen negative  No additional pertinent family history or toxic habits  No high risk sexual behavior, interested in full STI screen  Cancer screening:  -Colonoscopy 2/16 normal repeat 10 years  -Mammogram 8/23 ordered for repeat  -Pap smear - ASCUS, HPV negative repeat 3  years  -Skin-no current concerns  Immunizations: UTD screen for Hepatitis B immunity, flu shot and COVID booster recommended in the fall, up-to-date with Tdap, up-to-date with Shingrix  Orders:    Comprehensive Metabolic Panel; Future    Lipid Panel; Future    TSH with reflex to Free T4 if abnormal; Future    Vitamin D 25-Hydroxy,Total (for eval of Vitamin D levels); Future    CBC and Auto Differential; Future    Routine screening for STI (sexually transmitted infection)    Orders:    C. Trachomatis / N. Gonorrhoeae, Amplified Detection; Future    HIV 1/2 Antigen/Antibody Screen with Reflex to Confirmation; Future    Syphilis Screen with Reflex; Future    Encounter for hepatitis C screening test for low risk patient    Orders:    Hepatitis C Antibody; Future    Restless legs  Maintained on oral iron with improvement in symptoms, recheck iron levels to ensure ferritin levels above 75    Orders:    Ferritin; Future    Iron and TIBC; Future    Abscess of liver ([facility])  Status post drainage without complication, seen by hepatology       Autosomal dominant polycystic kidney disease  Followed by nephrology at the [facility] recommended consideration for tolvaptan that would require MRI prior  Kidney function currently stable  Consider ARB as opposed to amlodipine for hypertension management-will follow-up with nephrologist       Benign essential hypertension  As above, controlled on amlodipine, history of allergic reaction related to ACE inhibitor  Will inquire regarding ARB       Nontoxic goiter  Last thyroid ultrasound within normal limits in 2016  Last TFTs obtained slightly subclinical hyperthyroidism, questional history of hypothyroidism?  Recheck TFTs          Follow-up 6 months

## 2024-07-10 NOTE — ASSESSMENT & PLAN NOTE
Followed by nephrology at the [facility] recommended consideration for tolvaptan that would require MRI prior  Kidney function currently stable  Consider ARB as opposed to amlodipine for hypertension management-will follow-up with nephrologist

## 2024-07-10 NOTE — ASSESSMENT & PLAN NOTE
As above, controlled on amlodipine, history of allergic reaction related to ACE inhibitor  Will inquire regarding ARB

## 2024-07-10 NOTE — PATIENT INSTRUCTIONS
Mandy, it was a pleasure to see you today! Here is a list of things we have discussed and to follow up on:   For acid reflux - try PEPCID or TUMS  instead of the omeprazole.   Polycystic liver - followup with Dr. Quincy ricketts - referral to neurosurgery to determine next steps.   Ask your nephrologist if it would be appropriate to switch you to a different blood pressure medication called ARBs.   I have ordered blood and/or urine tests for you to do today. The lab can be found on this floor (2nd floor) next to the pharmacy across from the elevators.    Followup 6 months

## 2024-07-10 NOTE — ASSESSMENT & PLAN NOTE
Last thyroid ultrasound within normal limits in 2016  Last TFTs obtained slightly subclinical hyperthyroidism, questional history of hypothyroidism?  Recheck TFTs

## 2024-07-11 LAB
C TRACH RRNA SPEC QL NAA+PROBE: NEGATIVE
N GONORRHOEA DNA SPEC QL PROBE+SIG AMP: NEGATIVE

## 2024-07-13 DIAGNOSIS — E61.1 IRON DEFICIENCY: Primary | ICD-10-CM

## 2024-07-24 ENCOUNTER — LAB (OUTPATIENT)
Dept: LAB | Facility: LAB | Age: 59
End: 2024-07-24
Payer: COMMERCIAL

## 2024-07-24 DIAGNOSIS — E61.1 IRON DEFICIENCY: ICD-10-CM

## 2024-07-24 LAB
GLIADIN PEPTIDE IGA SER IA-ACNC: 1.5 U/ML
IGA SERPL-MCNC: 341 MG/DL (ref 70–400)
TTG IGA SER IA-ACNC: <1 U/ML
UREA BREATH TEST QL: NEGATIVE

## 2024-07-24 PROCEDURE — 83516 IMMUNOASSAY NONANTIBODY: CPT

## 2024-07-24 PROCEDURE — 36415 COLL VENOUS BLD VENIPUNCTURE: CPT

## 2024-07-24 PROCEDURE — 82784 ASSAY IGA/IGD/IGG/IGM EACH: CPT

## 2024-07-24 PROCEDURE — 83013 H PYLORI (C-13) BREATH: CPT

## 2024-07-26 LAB
GLIADIN PEPTIDE IGG SER IA-ACNC: <0.56 FLU (ref 0–4.99)
TTG IGG SER IA-ACNC: <0.82 FLU (ref 0–4.99)

## 2024-08-16 ENCOUNTER — APPOINTMENT (OUTPATIENT)
Dept: RADIOLOGY | Facility: CLINIC | Age: 59
End: 2024-08-16
Payer: COMMERCIAL

## 2024-08-19 DIAGNOSIS — J30.9 ALLERGIC RHINITIS, UNSPECIFIED SEASONALITY, UNSPECIFIED TRIGGER: ICD-10-CM

## 2024-08-19 DIAGNOSIS — I10 BENIGN ESSENTIAL HYPERTENSION: ICD-10-CM

## 2024-08-19 RX ORDER — FLUTICASONE PROPIONATE 50 MCG
1 SPRAY, SUSPENSION (ML) NASAL 2 TIMES DAILY
Qty: 16 G | Refills: 3 | Status: SHIPPED | OUTPATIENT
Start: 2024-08-19

## 2024-08-19 RX ORDER — AMLODIPINE BESYLATE 5 MG/1
5 TABLET ORAL DAILY
Qty: 90 TABLET | Refills: 1 | Status: SHIPPED | OUTPATIENT
Start: 2024-08-19

## 2024-09-16 ENCOUNTER — HOSPITAL ENCOUNTER (OUTPATIENT)
Dept: RADIOLOGY | Facility: CLINIC | Age: 59
Discharge: HOME | End: 2024-09-16
Payer: COMMERCIAL

## 2024-09-16 VITALS — WEIGHT: 161 LBS | BODY MASS INDEX: 28.53 KG/M2 | HEIGHT: 63 IN

## 2024-09-16 DIAGNOSIS — Z12.31 VISIT FOR SCREENING MAMMOGRAM: ICD-10-CM

## 2024-09-16 PROCEDURE — 77067 SCR MAMMO BI INCL CAD: CPT

## 2024-09-16 PROCEDURE — 77063 BREAST TOMOSYNTHESIS BI: CPT | Performed by: RADIOLOGY

## 2024-09-16 PROCEDURE — 77067 SCR MAMMO BI INCL CAD: CPT | Performed by: RADIOLOGY

## 2024-09-26 ENCOUNTER — APPOINTMENT (OUTPATIENT)
Dept: NEUROSURGERY | Facility: CLINIC | Age: 59
End: 2024-09-26
Payer: COMMERCIAL

## 2024-09-26 ENCOUNTER — OFFICE VISIT (OUTPATIENT)
Dept: NEUROSURGERY | Facility: CLINIC | Age: 59
End: 2024-09-26
Payer: COMMERCIAL

## 2024-09-26 VITALS
DIASTOLIC BLOOD PRESSURE: 84 MMHG | BODY MASS INDEX: 29.63 KG/M2 | HEART RATE: 70 BPM | WEIGHT: 161 LBS | TEMPERATURE: 96.8 F | HEIGHT: 62 IN | RESPIRATION RATE: 18 BRPM | SYSTOLIC BLOOD PRESSURE: 127 MMHG

## 2024-09-26 DIAGNOSIS — I67.1 CEREBRAL ANEURYSM (HHS-HCC): Primary | ICD-10-CM

## 2024-09-26 PROCEDURE — 3079F DIAST BP 80-89 MM HG: CPT | Performed by: NURSE PRACTITIONER

## 2024-09-26 PROCEDURE — 3074F SYST BP LT 130 MM HG: CPT | Performed by: NURSE PRACTITIONER

## 2024-09-26 PROCEDURE — 99202 OFFICE O/P NEW SF 15 MIN: CPT | Performed by: NURSE PRACTITIONER

## 2024-09-26 PROCEDURE — 99212 OFFICE O/P EST SF 10 MIN: CPT | Performed by: NURSE PRACTITIONER

## 2024-09-26 PROCEDURE — 3008F BODY MASS INDEX DOCD: CPT | Performed by: NURSE PRACTITIONER

## 2024-09-26 ASSESSMENT — PAIN SCALES - GENERAL: PAINLEVEL: 0-NO PAIN

## 2024-09-26 NOTE — PROGRESS NOTES
"[facility]  Neurosurgery    History of Present Illness      Mandy Taylor is a 58-year-old female with a PMH significant for HTN, HLD, GERD, polycystic kidney disease (Dr. Mcgowan [facility]), polycystic liver disease, hypothyroidism, RLS, and multiple cerebral aneurysm. Patient was seen in 2017 by Dr. Schwab after undergoing surveillance imaging for aneurysm given history of polycystic kidney disease. MRA at that time with 3 small ICA ophthalmic aneurysms. She was recommended for diagnostic cerebral angiogram at which time she declined. Patient presents to clinic to re-establish with neurosurgery and presents for NPV.     No first line familial history of aneurysm. She is a nonsmoker. BP controlled with amlodipine. Denies any headaches at this time, blurred or double vision.             Objective      Vitals:   /84   Pulse 70   Temp 36 °C (96.8 °F)   Resp 18   Ht 1.575 m (5' 2\")   Wt 73 kg (161 lb)   BMI 29.45 kg/m²         Physical Exam:    A&Ox3   Fluent speech   EOMI; FC x 4   GONZALEZ; strength 5/5; no drift   Face and shoulder shrug symmetrical   SILT   Tongue midline   No focal motor deficits on exam   Gait normal          Relevant Results:    No recent imaging for review         Assessment & Plan      Diagnosis:  Diagnoses and all orders for this visit:  Cerebral aneurysm ([facility])  -     MR angio head wo IV contrast          Provider Impression:     Patient is a 58-year-old female presenting for a NPV for known cerebral aneurysm. Last MRA was in 2016 where she was found to have 2 right ICA ophthalmic aneurysm measuring 2.8 and 2.2mm. There was a third L ICA ophthalmic aneurysm measuring 3.3mm. No new imaging for review.     Discussed with patient the natural history of intracerebral aneurysm and modifiable risk factors of HTN and tobacco use. Patient is a nonsmoker and BP is well controlled on current regimen.     Recommended at this time patient undergo formal diagnotic angiogram to " better define the lesions and evaluate if treatment will be required. Discussed risk and benefits for procedure which include but are not limited to stroke, bleeding, and infection. While risk of procedure are low and < 1% the procedure is not without risk.     At this time patient would like to peruse with noninvasive imaging. Once completed will have reviewed at CV conference. If the recommendation at that time continues to be cerebral angiogram patient will consider undergoing procedure.      Discussed warning s/s of cerebral aneurysm rupture and she should present to the ED if she were to develop WHOL, thunderclap headache, N/V.     Plan discussed with patient who was in agreement. All questions answered.     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 07/08/2013    History of polycystic kidney disease    Urinary tract infection, site not specified 12/08/2022    Acute lower UTI     Past Surgical History:   Procedure Laterality Date    ANTERIOR CRUCIATE LIGAMENT REPAIR  06/25/2013    Primary Repair Of Knee Ligament Cruciate Anterior    MR HEAD ANGIO WO IV CONTRAST  12/23/2016    MR HEAD ANGIO WO IV CONTRAST 12/23/2016 [facility] CLINICAL LEGACY    US GUIDED PERCUTANEOUS PERITONEAL OR RETROPERITONEAL FLUID COLLECTION DRAINAGE  2/6/2023    US GUIDED PERCUTANEOUS PERITONEAL OR RETROPERITONEAL FLUID COLLECTION DRAINAGE 2/6/2023 DOCTOR OFFICE LEGACY     Social History     Tobacco Use    Smoking status: Never    Smokeless tobacco: Never   Substance Use Topics    Alcohol use: Not Currently     Drug use: Never     Family History   Problem Relation Name Age of Onset    Other (CARDIAC ABNORMALITY) Mother      Diabetes Mother      Hypertension Mother      Polycystic kidney disease Mother      Diabetes Father      Hypertension Father      Breast cancer Sister       Allergies   Allergen Reactions    Ace Inhibitors Swelling    Iodinated Contrast Media Hives    Lisinopril Swelling     Current Outpatient Medications   Medication Instructions    acetaminophen (Tylenol) 325 mg tablet oral, Every 4 hours PRN    amLODIPine (NORVASC) 5 mg, oral, Daily    cetirizine (ZyrTEC) 10 mg chewable tablet oral, Daily PRN    cholecalciferol (Vitamin D-3) 50,000 unit capsule oral    cyanocobalamin, vitamin B-12, (VITAMIN B-12 ORAL) oral    docosahexaenoic acid (PRENATAL DHA ORAL) 1 tablet, oral, Daily    fluticasone (Flonase) 50 mcg/actuation nasal spray 1 spray, Each Nostril, 2 times daily    omeprazole (PRILOSEC) 40 mg, oral, Daily

## 2024-10-09 ENCOUNTER — APPOINTMENT (OUTPATIENT)
Dept: GASTROENTEROLOGY | Facility: HOSPITAL | Age: 59
End: 2024-10-09
Payer: COMMERCIAL

## 2024-10-21 ENCOUNTER — HOSPITAL ENCOUNTER (OUTPATIENT)
Dept: RADIOLOGY | Facility: CLINIC | Age: 59
Discharge: HOME | End: 2024-10-21
Payer: COMMERCIAL

## 2024-10-21 PROCEDURE — 70544 MR ANGIOGRAPHY HEAD W/O DYE: CPT | Performed by: RADIOLOGY

## 2024-10-21 PROCEDURE — 70544 MR ANGIOGRAPHY HEAD W/O DYE: CPT

## 2024-10-30 NOTE — RESULT ENCOUNTER NOTE
MRA Head 10/23: Stable ophthalmic aneurysm L ICA measuriing 3mm and  R ICA measuring 2mm.     Stable since prior imaging in 2016     Follow up: Continue with imaging surveillance with repeat MRA in 3 years due 10/2027

## 2024-11-15 ENCOUNTER — CLINICAL SUPPORT (OUTPATIENT)
Dept: PRIMARY CARE | Facility: CLINIC | Age: 59
End: 2024-11-15
Payer: COMMERCIAL

## 2024-11-15 DIAGNOSIS — Z23 ENCOUNTER FOR IMMUNIZATION: Primary | ICD-10-CM

## 2024-11-15 PROCEDURE — 90471 IMMUNIZATION ADMIN: CPT | Performed by: INTERNAL MEDICINE

## 2024-11-15 PROCEDURE — 90656 IIV3 VACC NO PRSV 0.5 ML IM: CPT | Performed by: INTERNAL MEDICINE

## 2024-11-15 NOTE — PROGRESS NOTES
Pt was identified by name and . Flu vaccine was given in LT deltoid, and pt tolerated well. Allergies were reviewed, and VIS was given.   Pt will follow up with PCP at next appt or PRN.

## 2024-12-26 ENCOUNTER — APPOINTMENT (OUTPATIENT)
Dept: GASTROENTEROLOGY | Facility: CLINIC | Age: 59
End: 2024-12-26
Payer: COMMERCIAL

## 2025-01-02 ENCOUNTER — OFFICE VISIT (OUTPATIENT)
Dept: GASTROENTEROLOGY | Facility: CLINIC | Age: 60
End: 2025-01-02
Payer: COMMERCIAL

## 2025-01-02 VITALS
SYSTOLIC BLOOD PRESSURE: 135 MMHG | DIASTOLIC BLOOD PRESSURE: 88 MMHG | TEMPERATURE: 97.7 F | WEIGHT: 167 LBS | HEIGHT: 62 IN | HEART RATE: 67 BPM | BODY MASS INDEX: 30.73 KG/M2

## 2025-01-02 DIAGNOSIS — Z12.11 COLON CANCER SCREENING: ICD-10-CM

## 2025-01-02 DIAGNOSIS — Q61.2 AUTOSOMAL DOMINANT POLYCYSTIC KIDNEY DISEASE: ICD-10-CM

## 2025-01-02 DIAGNOSIS — Q44.6 PLD (POLYCYSTIC LIVER DISEASE): Primary | ICD-10-CM

## 2025-01-02 DIAGNOSIS — E61.1 IRON DEFICIENCY: ICD-10-CM

## 2025-01-02 PROCEDURE — 3008F BODY MASS INDEX DOCD: CPT | Performed by: INTERNAL MEDICINE

## 2025-01-02 PROCEDURE — 3075F SYST BP GE 130 - 139MM HG: CPT | Performed by: INTERNAL MEDICINE

## 2025-01-02 PROCEDURE — G2211 COMPLEX E/M VISIT ADD ON: HCPCS | Performed by: INTERNAL MEDICINE

## 2025-01-02 PROCEDURE — 99213 OFFICE O/P EST LOW 20 MIN: CPT | Performed by: INTERNAL MEDICINE

## 2025-01-02 PROCEDURE — 3079F DIAST BP 80-89 MM HG: CPT | Performed by: INTERNAL MEDICINE

## 2025-01-02 RX ORDER — FERROUS SULFATE 325(65) MG
325 TABLET, DELAYED RELEASE (ENTERIC COATED) ORAL
COMMUNITY

## 2025-01-02 NOTE — LETTER
January 6, 2025     Emily Funes DO  [address] [facility] [address]    Patient: Mandy Taylor   YOB: 1965   Date of Visit: 1/2/2025       Dear Dr. Emily Funes DO:    Thank you for referring Mandy Taylor to me for evaluation. Below are my notes for this consultation.  If you have questions, please do not hesitate to call me. I look forward to following your patient along with you.       Sincerely,     Rob Mathew MD      CC: No Recipients  ______________________________________________________________________________________    Subjective     Follow up appointment for polycystic liver disease.    History of Present Illness:   Mandy Taylor is a 59 y.o. female who presents to Hepatology clinic for follow up.    Last seen in Hepatology Clinic 6/29/23 after a re-hospitalization in May 2023. Seems to be doing relatively well.     Initial presentation - she first developed problems from her polycystic liver disease last December 2022, when she had pyelonephritis and bacteremia which may have led to seeding and infecting a liver cyst. Another possibility was some spontaneous hemorrhage in a liver cyst which later led to superinfection. Regardless, the thinking is that this infection episode may have been undertreated and led to recurrent infection - earlier this year in February 2023.     The second presentation was again for an infected liver cyst. She was treated broadly with IV antibiotics, the cyst was aspirated and is currently being drained, and she was co-managed by surgery, hepatology, infectious disease.      Ultimately the percutaneous drains were removed some time in March 2023.   She was doing quite all, without the need for ongoing drainage, and after completion of a course of antibiotics.  However, had another hospitalization in early May at [facility]. Again had fevers and shaking chills the day of presentation to the hospital.  CT scan as below. Elevated WBC, blood  cultures - negative.  Empirically treated with Vanco, Zosyn --> Augmentin.  Completed 4 weeks of Augmentin - last dose earlier this month.    Review of Systems  Review of Systems    Now taking an iron supplement ~ 5 days per week.    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     Social History   reports that she has never smoked. She has never used smokeless tobacco. She reports that she does not currently use alcohol. She reports that she does not use drugs.     Family History  family history includes Breast cancer in her sister; CARDIAC ABNORMALITY in her mother; Diabetes in her father and mother; Hypertension in her father and mother; Polycystic kidney disease in her mother.     Allergies  Allergies   Allergen Reactions   • Ace Inhibitors Swelling   • Iodinated Contrast Media Hives   • Lisinopril Swelling       Medications  Current Outpatient Medications   Medication Instructions   • acetaminophen (Tylenol) 325 mg tablet Every 4 hours PRN   • amLODIPine (NORVASC) 5 mg, oral, Daily   • cetirizine (ZyrTEC) 10 mg chewable tablet Daily PRN   • cholecalciferol (Vitamin D-3) 50,000 unit capsule Take by mouth.   • cyanocobalamin, vitamin B-12, (VITAMIN B-12 ORAL) Take by mouth.   • docosahexaenoic acid (PRENATAL DHA ORAL) 1 tablet, Daily   • ferrous sulfate 325 mg, 3 times daily (morning, midday, late afternoon)   • fluticasone (Flonase) 50 mcg/actuation nasal spray 1 spray, Each Nostril, 2 times daily   • omeprazole (PRILOSEC) 40 mg, oral, Daily        Objective   Visit Vitals  /88   Pulse 67   Temp 36.5 °C (97.7 °F)  "(Temporal)          10/13/2023     9:39 AM 11/7/2023    10:06 AM 2/19/2024    12:57 PM 7/10/2024     9:40 AM 9/16/2024    10:58 AM 9/26/2024    10:49 AM 1/2/2025     9:35 AM   Vitals   Systolic 122 132  112  127 135   Diastolic 82 88  72  84 88   BP Location  Right arm        Heart Rate  85  64  70 67   Temp      36 °C (96.8 °F) 36.5 °C (97.7 °F)   Resp      18    Height  1.588 m (5' 2.5\") 1.588 m (5' 2.5\")  1.588 m (5' 2.5\") 1.575 m (5' 2\") 1.575 m (5' 2\")   Weight (lb)  154  160 161 161 167   BMI  27.72 kg/m2 27.72 kg/m2 28.8 kg/m2 28.98 kg/m2 29.45 kg/m2 30.54 kg/m2   BSA (m2)  1.76 m2 1.76 m2 1.79 m2 1.79 m2 1.79 m2 1.82 m2   Visit Report Report Report  Report  Report Report     Physical Exam    Constitutional   General appearance: In no acute distress .~She looks well today. Much improved from her initial visit in Febraury.   Eyes   Anicteric Sclerae .   Pulmonary   Auscultation of lungs: Clear.    Cardiovascular   Auscultation of heart: RRR without murmur. ~   Examination of extremities for edema: Normal.    Abdomen   Soft, non-tender. ~   Bowel sounds normal.~   No hepatomegaly or splenomegaly. ~Abd exam normal today.     Labs    WBC   Date/Time Value Ref Range Status   07/10/2024 11:19 AM 6.1 4.4 - 11.3 x10*3/uL Final     Hemoglobin   Date/Time Value Ref Range Status   07/10/2024 11:19 AM 14.9 12.0 - 16.0 g/dL Final     Hematocrit   Date/Time Value Ref Range Status   07/10/2024 11:19 AM 46.5 (H) 36.0 - 46.0 % Final     MCV   Date/Time Value Ref Range Status   07/10/2024 11:19 AM 94 80 - 100 fL Final     Platelets   Date/Time Value Ref Range Status   07/10/2024 11:19  150 - 450 x10*3/uL Final        Total Protein   Date/Time Value Ref Range Status   07/10/2024 11:19 AM 7.9 6.4 - 8.2 g/dL Final     Albumin   Date/Time Value Ref Range Status   07/10/2024 11:19 AM 4.4 3.4 - 5.0 g/dL Final     AST   Date/Time Value Ref Range Status   07/10/2024 11:19 AM 19 9 - 39 U/L Final     ALT   Date/Time Value Ref " Range Status   07/10/2024 11:19 AM 16 7 - 45 U/L Final     Comment:     Patients treated with Sulfasalazine may generate falsely decreased results for ALT.     Alkaline Phosphatase   Date/Time Value Ref Range Status   07/10/2024 11:19  33 - 110 U/L Final     Bilirubin, Total   Date/Time Value Ref Range Status   07/10/2024 11:19 AM 0.5 0.0 - 1.2 mg/dL Final     Bilirubin, Direct   Date/Time Value Ref Range Status   05/03/2023 03:46 PM 0.2 0.0 - 0.3 mg/dL Final     GGT   Date/Time Value Ref Range Status   02/05/2023 12:08 AM 91 (H) 5 - 55 U/L Final        Vitamin D, 25-Hydroxy, Total   Date/Time Value Ref Range Status   07/10/2024 11:19 AM 37 30 - 100 ng/mL Final        AST   Date/Time Value Ref Range Status   07/10/2024 11:19 AM 19 9 - 39 U/L Final     ALT   Date/Time Value Ref Range Status   07/10/2024 11:19 AM 16 7 - 45 U/L Final     Comment:     Patients treated with Sulfasalazine may generate falsely decreased results for ALT.     Alkaline Phosphatase   Date/Time Value Ref Range Status   07/10/2024 11:19  33 - 110 U/L Final     Bilirubin, Total   Date/Time Value Ref Range Status   07/10/2024 11:19 AM 0.5 0.0 - 1.2 mg/dL Final     Bilirubin, Direct   Date/Time Value Ref Range Status   05/03/2023 03:46 PM 0.2 0.0 - 0.3 mg/dL Final     GGT   Date/Time Value Ref Range Status   02/05/2023 12:08 AM 91 (H) 5 - 55 U/L Final     Albumin   Date/Time Value Ref Range Status   07/10/2024 11:19 AM 4.4 3.4 - 5.0 g/dL Final     Total Protein   Date/Time Value Ref Range Status   07/10/2024 11:19 AM 7.9 6.4 - 8.2 g/dL Final        Protime   Date/Time Value Ref Range Status   02/07/2023 08:22 AM 13.7 (H) 9.8 - 13.4 sec Final     INR   Date/Time Value Ref Range Status   02/07/2023 08:22 AM 1.2 (H) 0.9 - 1.1 Final     CT Abd and Pelv  5/2023    Impression   1.  Interval improvement of  hyperdense contents in the confluence of  hepatic cyst in the hepatic dome likely to favor blood products with  again no evidence of  extrahepatic hyperdense contents to suggest  hemorrhagic rupture.  2. Visualized involuted small hypodense area with capsular retraction  in segment 6/7 likely representing interval resolution of prior  hepatic abscess status post drainage with no evidence of new  intra-abdominal hepatic abscesses.  3. Similar size and appearance of bilateral renal cysts consistent  with polycystic kidney disease.     Assessment/Plan   Mandy Taylor is a 59 y.o. female who presents to Liver clinic for follow up for Polycystic Liver Disease..    She has completed treatment for her infected liver cyst. At this point there does not appear to be a role for surgical management.  Her kidney function is normal at this time. There is no role/indication for liver and or kidney transplant evaluation at this time.  She has established care with a a new nephrologist, who will continue to monitor for progression of kidney disease. She has chosen to defer therapy with Tolvaptan at this time.     We discussed today, regarding her risk for another cyst infection.   Seems like she is better, and hopefully this will not recur.    Other issues discussed/assessed today:    Polycystic - stable.    Iron def - not causing anemia or changes in the red blood cells. Can follow up with PCP.    Colon cancer screening - 2026     S Quincy LINARES         Instructions      Rob Mathew MD

## 2025-01-02 NOTE — PATIENT INSTRUCTIONS
Welcome to Dr. Rob Mathew's [facility].  Dr. Mathew sees patients at the following sites:  [place] [address] Liver/GI Clinic at [facility] [address], [address] at [facility], [facility] [address]    Dr. Mathew's hepatology care coordinator, Ondina MEJIA, can be reached at [phone].  Dr. Mathew's , Tarah Hernandez, can be reached at [phone].     You are due for a screening Colonoscopy in January 2026. Your PCP can order.     Get an Abdominal Ultrasound in 1 year.  January 2026.   Scheduling Number: [phone]    Follow up in clinic in 1 year, after Abdominal Ultrasound completed. Call around October 2025 to schedule. [phone].

## 2025-01-02 NOTE — PROGRESS NOTES
Subjective     Follow up appointment for polycystic liver disease.    History of Present Illness:   Mandy Taylor is a 59 y.o. female who presents to Hepatology clinic for follow up.    Last seen in Hepatology Clinic 6/29/23 after a re-hospitalization in May 2023. Seems to be doing relatively well.     Initial presentation - she first developed problems from her polycystic liver disease last December 2022, when she had pyelonephritis and bacteremia which may have led to seeding and infecting a liver cyst. Another possibility was some spontaneous hemorrhage in a liver cyst which later led to superinfection. Regardless, the thinking is that this infection episode may have been undertreated and led to recurrent infection - earlier this year in February 2023.     The second presentation was again for an infected liver cyst. She was treated broadly with IV antibiotics, the cyst was aspirated and is currently being drained, and she was co-managed by surgery, hepatology, infectious disease.      Ultimately the percutaneous drains were removed some time in March 2023.   She was doing quite all, without the need for ongoing drainage, and after completion of a course of antibiotics.  However, had another hospitalization in early May at [facility]. Again had fevers and shaking chills the day of presentation to the hospital.  CT scan as below. Elevated WBC, blood cultures - negative.  Empirically treated with Vanco, Zosyn --> Augmentin.  Completed 4 weeks of Augmentin - last dose earlier this month.    Review of Systems  Review of Systems    Now taking an iron supplement ~ 5 days per week.    Past Medical History   has a past medical history of Aneurysm of unspecified site (CMS-HCC), Encounter for gynecological examination (general) (routine) without abnormal findings, Nontoxic diffuse goiter, Personal history of other diseases of the circulatory system, Personal history of other specified (corrected) congenital malformations of  "genitourinary system (07/08/2013), and Urinary tract infection, site not specified (12/08/2022).     Social History   reports that she has never smoked. She has never used smokeless tobacco. She reports that she does not currently use alcohol. She reports that she does not use drugs.     Family History  family history includes Breast cancer in her sister; CARDIAC ABNORMALITY in her mother; Diabetes in her father and mother; Hypertension in her father and mother; Polycystic kidney disease in her mother.     Allergies  Allergies   Allergen Reactions    Ace Inhibitors Swelling    Iodinated Contrast Media Hives    Lisinopril Swelling       Medications  Current Outpatient Medications   Medication Instructions    acetaminophen (Tylenol) 325 mg tablet Every 4 hours PRN    amLODIPine (NORVASC) 5 mg, oral, Daily    cetirizine (ZyrTEC) 10 mg chewable tablet Daily PRN    cholecalciferol (Vitamin D-3) 50,000 unit capsule Take by mouth.    cyanocobalamin, vitamin B-12, (VITAMIN B-12 ORAL) Take by mouth.    docosahexaenoic acid (PRENATAL DHA ORAL) 1 tablet, Daily    ferrous sulfate 325 mg, 3 times daily (morning, midday, late afternoon)    fluticasone (Flonase) 50 mcg/actuation nasal spray 1 spray, Each Nostril, 2 times daily    omeprazole (PRILOSEC) 40 mg, oral, Daily        Objective   Visit Vitals  /88   Pulse 67   Temp 36.5 °C (97.7 °F) (Temporal)          10/13/2023     9:39 AM 11/7/2023    10:06 AM 2/19/2024    12:57 PM 7/10/2024     9:40 AM 9/16/2024    10:58 AM 9/26/2024    10:49 AM 1/2/2025     9:35 AM   Vitals   Systolic 122 132  112  127 135   Diastolic 82 88  72  84 88   BP Location  Right arm        Heart Rate  85  64  70 67   Temp      36 °C (96.8 °F) 36.5 °C (97.7 °F)   Resp      18    Height  1.588 m (5' 2.5\") 1.588 m (5' 2.5\")  1.588 m (5' 2.5\") 1.575 m (5' 2\") 1.575 m (5' 2\")   Weight (lb)  154  160 161 161 167   BMI  27.72 kg/m2 27.72 kg/m2 28.8 kg/m2 28.98 kg/m2 29.45 kg/m2 30.54 kg/m2   BSA (m2)  1.76 " m2 1.76 m2 1.79 m2 1.79 m2 1.79 m2 1.82 m2   Visit Report Report Report  Report  Report Report     Physical Exam    Constitutional   General appearance: In no acute distress .~She looks well today. Much improved from her initial visit in Febraury.   Eyes   Anicteric Sclerae .   Pulmonary   Auscultation of lungs: Clear.    Cardiovascular   Auscultation of heart: RRR without murmur. ~   Examination of extremities for edema: Normal.    Abdomen   Soft, non-tender. ~   Bowel sounds normal.~   No hepatomegaly or splenomegaly. ~Abd exam normal today.     Labs    WBC   Date/Time Value Ref Range Status   07/10/2024 11:19 AM 6.1 4.4 - 11.3 x10*3/uL Final     Hemoglobin   Date/Time Value Ref Range Status   07/10/2024 11:19 AM 14.9 12.0 - 16.0 g/dL Final     Hematocrit   Date/Time Value Ref Range Status   07/10/2024 11:19 AM 46.5 (H) 36.0 - 46.0 % Final     MCV   Date/Time Value Ref Range Status   07/10/2024 11:19 AM 94 80 - 100 fL Final     Platelets   Date/Time Value Ref Range Status   07/10/2024 11:19  150 - 450 x10*3/uL Final        Total Protein   Date/Time Value Ref Range Status   07/10/2024 11:19 AM 7.9 6.4 - 8.2 g/dL Final     Albumin   Date/Time Value Ref Range Status   07/10/2024 11:19 AM 4.4 3.4 - 5.0 g/dL Final     AST   Date/Time Value Ref Range Status   07/10/2024 11:19 AM 19 9 - 39 U/L Final     ALT   Date/Time Value Ref Range Status   07/10/2024 11:19 AM 16 7 - 45 U/L Final     Comment:     Patients treated with Sulfasalazine may generate falsely decreased results for ALT.     Alkaline Phosphatase   Date/Time Value Ref Range Status   07/10/2024 11:19  33 - 110 U/L Final     Bilirubin, Total   Date/Time Value Ref Range Status   07/10/2024 11:19 AM 0.5 0.0 - 1.2 mg/dL Final     Bilirubin, Direct   Date/Time Value Ref Range Status   05/03/2023 03:46 PM 0.2 0.0 - 0.3 mg/dL Final     GGT   Date/Time Value Ref Range Status   02/05/2023 12:08 AM 91 (H) 5 - 55 U/L Final        Vitamin D, 25-Hydroxy, Total    Date/Time Value Ref Range Status   07/10/2024 11:19 AM 37 30 - 100 ng/mL Final        AST   Date/Time Value Ref Range Status   07/10/2024 11:19 AM 19 9 - 39 U/L Final     ALT   Date/Time Value Ref Range Status   07/10/2024 11:19 AM 16 7 - 45 U/L Final     Comment:     Patients treated with Sulfasalazine may generate falsely decreased results for ALT.     Alkaline Phosphatase   Date/Time Value Ref Range Status   07/10/2024 11:19  33 - 110 U/L Final     Bilirubin, Total   Date/Time Value Ref Range Status   07/10/2024 11:19 AM 0.5 0.0 - 1.2 mg/dL Final     Bilirubin, Direct   Date/Time Value Ref Range Status   05/03/2023 03:46 PM 0.2 0.0 - 0.3 mg/dL Final     GGT   Date/Time Value Ref Range Status   02/05/2023 12:08 AM 91 (H) 5 - 55 U/L Final     Albumin   Date/Time Value Ref Range Status   07/10/2024 11:19 AM 4.4 3.4 - 5.0 g/dL Final     Total Protein   Date/Time Value Ref Range Status   07/10/2024 11:19 AM 7.9 6.4 - 8.2 g/dL Final        Protime   Date/Time Value Ref Range Status   02/07/2023 08:22 AM 13.7 (H) 9.8 - 13.4 sec Final     INR   Date/Time Value Ref Range Status   02/07/2023 08:22 AM 1.2 (H) 0.9 - 1.1 Final     CT Abd and Pelv  5/2023    Impression   1.  Interval improvement of  hyperdense contents in the confluence of  hepatic cyst in the hepatic dome likely to favor blood products with  again no evidence of extrahepatic hyperdense contents to suggest  hemorrhagic rupture.  2. Visualized involuted small hypodense area with capsular retraction  in segment 6/7 likely representing interval resolution of prior  hepatic abscess status post drainage with no evidence of new  intra-abdominal hepatic abscesses.  3. Similar size and appearance of bilateral renal cysts consistent  with polycystic kidney disease.     Assessment/Plan   Mandy Taylor is a 59 y.o. female who presents to Liver clinic for follow up for Polycystic Liver Disease..    She has completed treatment for her infected liver cyst. At  this point there does not appear to be a role for surgical management.  Her kidney function is normal at this time. There is no role/indication for liver and or kidney transplant evaluation at this time.  She has established care with a a new nephrologist, who will continue to monitor for progression of kidney disease. She has chosen to defer therapy with Tolvaptan at this time.     We discussed today, regarding her risk for another cyst infection.   Seems like she is better, and hopefully this will not recur.    Other issues discussed/assessed today:    Polycystic - stable.    Iron def - not causing anemia or changes in the red blood cells. Can follow up with PCP.    Colon cancer screening - 2026     USMAN Mathew MD         Instructions      Rob Mathew MD

## 2025-01-09 ENCOUNTER — APPOINTMENT (OUTPATIENT)
Dept: GASTROENTEROLOGY | Facility: CLINIC | Age: 60
End: 2025-01-09
Payer: COMMERCIAL

## 2025-01-15 ENCOUNTER — APPOINTMENT (OUTPATIENT)
Dept: PRIMARY CARE | Facility: CLINIC | Age: 60
End: 2025-01-15
Payer: COMMERCIAL

## 2025-01-15 VITALS
OXYGEN SATURATION: 94 % | TEMPERATURE: 97.7 F | DIASTOLIC BLOOD PRESSURE: 83 MMHG | HEIGHT: 62 IN | WEIGHT: 164 LBS | SYSTOLIC BLOOD PRESSURE: 119 MMHG | BODY MASS INDEX: 30.18 KG/M2 | HEART RATE: 84 BPM

## 2025-01-15 DIAGNOSIS — I67.1 ANEURYSM, CEREBRAL (HHS-HCC): ICD-10-CM

## 2025-01-15 DIAGNOSIS — E61.1 IRON DEFICIENCY: Primary | ICD-10-CM

## 2025-01-15 DIAGNOSIS — I10 BENIGN ESSENTIAL HYPERTENSION: ICD-10-CM

## 2025-01-15 DIAGNOSIS — J30.9 ALLERGIC RHINITIS, UNSPECIFIED SEASONALITY, UNSPECIFIED TRIGGER: ICD-10-CM

## 2025-01-15 DIAGNOSIS — Q61.2 AUTOSOMAL DOMINANT POLYCYSTIC KIDNEY DISEASE: ICD-10-CM

## 2025-01-15 DIAGNOSIS — Z00.00 ENCOUNTER FOR PREVENTATIVE ADULT HEALTH CARE EXAMINATION: ICD-10-CM

## 2025-01-15 PROCEDURE — 3008F BODY MASS INDEX DOCD: CPT | Performed by: INTERNAL MEDICINE

## 2025-01-15 PROCEDURE — 99214 OFFICE O/P EST MOD 30 MIN: CPT | Performed by: INTERNAL MEDICINE

## 2025-01-15 PROCEDURE — 3079F DIAST BP 80-89 MM HG: CPT | Performed by: INTERNAL MEDICINE

## 2025-01-15 PROCEDURE — 3074F SYST BP LT 130 MM HG: CPT | Performed by: INTERNAL MEDICINE

## 2025-01-15 PROCEDURE — 1036F TOBACCO NON-USER: CPT | Performed by: INTERNAL MEDICINE

## 2025-01-15 ASSESSMENT — PAIN SCALES - GENERAL: PAINLEVEL_OUTOF10: 0-NO PAIN

## 2025-01-15 NOTE — PROGRESS NOTES
Subjective   Patient ID: Mandy Taylor is a 59 y.o. female who presents for Follow-up.  HPI  59-year-old female here for follow-up visit, last seen in July for establishment of care preventive care visit  - Nasal congestion uses flonase and zyrtec. This does help improve symptoms.     Past medical history  - HTN - on amlodipine 5  - GERD - previously on PPI discontinued. Uses tums prn.   - Prediabetes-resolved - strong family history of diabetes.   - Polycystic kidney disease - followed by nephrology at the [facility] Dr. Mcgowan recommended consideration for tolvaptan with MRI prior, she currently defers allergy to ACE  - Cerebral aneurysm, multiple small- last imaging in 2016 - referred to neurosurgery seen by NP MRA head 10/24 stable aneurysms recommended repeat 3 years 10/27  - Polycystic liver disease - seen by Dr. Mathew last seen in June history of hepatic abscess with drainage last seen earlier this month  - Hip dysplasia - seen by Dr. Jim Blank s/p PT now improved.   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 Mo   - Dyslipidemia, mild atheromatous calcification - CAC 2/24 0   - Restless legs and iron deficiency-on oral iron noted to have iron deficiency recommended follow-up with GI screen for celiac and H. pylori which were negative refer to gastroenterology now resolved.   - ?Hypothyroidism previously on levothyroxine, last TFTs showing slightly low TSH.      Social:   - Lives at home with  and 25 year old son  - Drives school van   - 4 children, 2 grandchildren one on the way, youngest 25   - No tobacco, alcohol or illicits     Current Outpatient Medications   Medication Instructions    acetaminophen (Tylenol) 325 mg tablet Every 4 hours PRN    amLODIPine (NORVASC) 5 mg, oral, Daily    cetirizine (ZyrTEC) 10 mg chewable tablet Daily PRN    cyanocobalamin, vitamin B-12, (VITAMIN B-12 ORAL) Take by mouth.    ferrous sulfate 325 mg, 3 times daily (morning, midday, late  "afternoon)        Objective     /83   Pulse 84   Temp 36.5 °C (97.7 °F)   Ht 1.575 m (5' 2\")   Wt 74.4 kg (164 lb)   SpO2 94%   BMI 30.00 kg/m²     Physical Exam  General: Appears comfortable, NAD, appropriate affect  HEENT: NCAT, EOMI, pupils symmetric, no conjunctival erythema, no gingival abnormalities   Heart: RRR S1 S2 no murmurs appreciated   Lungs: CTA bilaterally, no rhonchi, rales, or wheezes   Extremities: no cyanosis or edema appreciated  Neuro: AAO x 3, answers questions appropriately, no FND    Lab Results   Component Value Date    WBC 6.1 07/10/2024    HGB 14.9 07/10/2024    HCT 46.5 (H) 07/10/2024     07/10/2024    CHOL 217 (H) 07/10/2024    TRIG 108 07/10/2024    HDL 79.1 07/10/2024    ALT 16 07/10/2024    AST 19 07/10/2024     07/10/2024    K 3.9 07/10/2024     07/10/2024    CREATININE 0.95 07/10/2024    BUN 17 07/10/2024    CO2 32 07/10/2024    TSH 0.90 07/10/2024    INR 1.2 (H) 02/07/2023    HGBA1C 5.6 07/10/2024     Independently reviewed most recent bloodwork        Assessment/Plan     Assessment & Plan  Aneurysm, cerebral ([facility])  Most recent MRI performed 10/24 stable aneurysms recommended repeat in 3 years  Followed by neurosurgery team    Orders:    Follow Up In [facility] - PCP - Established    Iron deficiency  Recheck iron levels after 5d/week supplementation.   Colonoscopy due next year if still on the low end may pursue more aggressive workup.     Orders:    Ferritin; Future    Iron and TIBC; Future    CBC and Auto Differential; Future    Benign essential hypertension  Controlled on amlodipine, mild elevation in diastolic reading advised home blood pressure monitoring         Autosomal dominant polycystic kidney disease  Followed by nephrology at the [facility] recommended consideration for tolvaptan that would require MRI prior, ultimately declined  Kidney function currently stable  Continuation of amlodipine due to allergy to ACE       "     Allergic rhinitis, unspecified seasonality, unspecified trigger  Some improvement with Flonase and Zyrtec may consider Cellasene if persists         Encounter for preventative adult health care examination    Orders:    Follow Up In [facility] - PCP - Health Maintenance; Future      Health maintenance  Cancer screening:  -Colonoscopy 2/16 normal repeat 10 years  -Mammogram 9/24  -Pap smear-ASCUS, HPV negative repeat 3 years upcoming visit scheduled   Immunizations: Hep B vaccination due ,UTD with flu shot, declines

## 2025-01-15 NOTE — ASSESSMENT & PLAN NOTE
Recheck iron levels after 5d/week supplementation.   Colonoscopy due next year if still on the low end may pursue more aggressive workup.     Orders:    Ferritin; Future    Iron and TIBC; Future    CBC and Auto Differential; Future

## 2025-01-15 NOTE — ASSESSMENT & PLAN NOTE
Followed by nephrology at the [facility] recommended consideration for tolvaptan that would require MRI prior, ultimately declined  Kidney function currently stable  Continuation of amlodipine due to allergy to ACE

## 2025-01-15 NOTE — ASSESSMENT & PLAN NOTE
Most recent MRI performed 10/24 stable aneurysms recommended repeat in 3 years  Followed by neurosurgery team    Orders:    Follow Up In [facility] - PCP - Established

## 2025-01-15 NOTE — ASSESSMENT & PLAN NOTE
Controlled on amlodipine, mild elevation in diastolic reading advised home blood pressure monitoring

## 2025-01-15 NOTE — PATIENT INSTRUCTIONS
Mandy,   Inquire regarding Hepatitis B vaccine with your insurance company.  Continue healthy lifestyle   Blood pressure   Go to www.validatebp.org for list of validated blood pressure cuffs.   I recommend you monitor your home blood pressure readings. To do this, be sure that the blood pressure cuff is on bare skin. Feet should be planted on the floor and back should be supported. Arm should be resting at the level of the heart. No talking to anyone during measurement and no caffeine within 30 minutes of checking blood pressure.   Goal should be in the 120s/70s on AVERAGE (outliers do not count).     Follow-up 6 months for your physical

## 2025-01-21 ENCOUNTER — APPOINTMENT (OUTPATIENT)
Dept: OBSTETRICS AND GYNECOLOGY | Facility: CLINIC | Age: 60
End: 2025-01-21
Payer: COMMERCIAL

## 2025-01-21 VITALS — WEIGHT: 166 LBS | BODY MASS INDEX: 30.36 KG/M2 | DIASTOLIC BLOOD PRESSURE: 88 MMHG | SYSTOLIC BLOOD PRESSURE: 130 MMHG

## 2025-01-21 DIAGNOSIS — Z12.31 ENCOUNTER FOR SCREENING MAMMOGRAM FOR MALIGNANT NEOPLASM OF BREAST: ICD-10-CM

## 2025-01-21 DIAGNOSIS — J30.9 ALLERGIC RHINITIS, UNSPECIFIED SEASONALITY, UNSPECIFIED TRIGGER: ICD-10-CM

## 2025-01-21 DIAGNOSIS — Z01.419 ENCOUNTER FOR ANNUAL ROUTINE GYNECOLOGICAL EXAMINATION: Primary | ICD-10-CM

## 2025-01-21 DIAGNOSIS — N89.8 VAGINAL DISCHARGE: ICD-10-CM

## 2025-01-21 PROCEDURE — 3079F DIAST BP 80-89 MM HG: CPT | Performed by: OBSTETRICS & GYNECOLOGY

## 2025-01-21 PROCEDURE — 99386 PREV VISIT NEW AGE 40-64: CPT | Performed by: OBSTETRICS & GYNECOLOGY

## 2025-01-21 PROCEDURE — 87205 SMEAR GRAM STAIN: CPT

## 2025-01-21 PROCEDURE — 1036F TOBACCO NON-USER: CPT | Performed by: OBSTETRICS & GYNECOLOGY

## 2025-01-21 PROCEDURE — 88175 CYTOPATH C/V AUTO FLUID REDO: CPT

## 2025-01-21 PROCEDURE — 3075F SYST BP GE 130 - 139MM HG: CPT | Performed by: OBSTETRICS & GYNECOLOGY

## 2025-01-21 PROCEDURE — 87624 HPV HI-RISK TYP POOLED RSLT: CPT

## 2025-01-21 RX ORDER — FLUTICASONE PROPIONATE 50 MCG
1 SPRAY, SUSPENSION (ML) NASAL 2 TIMES DAILY
Qty: 16 G | Refills: 3 | COMMUNITY
Start: 2025-01-21 | End: 2025-01-21

## 2025-01-21 ASSESSMENT — ENCOUNTER SYMPTOMS
CONSTITUTIONAL NEGATIVE: 0
HEMATOLOGIC/LYMPHATIC NEGATIVE: 0
ENDOCRINE NEGATIVE: 0
RESPIRATORY NEGATIVE: 0
CARDIOVASCULAR NEGATIVE: 0
EYES NEGATIVE: 0
NEUROLOGICAL NEGATIVE: 0
MUSCULOSKELETAL NEGATIVE: 0
ALLERGIC/IMMUNOLOGIC NEGATIVE: 0
PSYCHIATRIC NEGATIVE: 0
GASTROINTESTINAL NEGATIVE: 0

## 2025-01-21 NOTE — PATIENT INSTRUCTIONS
Thanks for coming in today for your annual GYN exam.      A Pap smear was sent.  Results should be available in the next few weeks.      Review information about menopause.      Review the information about vulvovaginal care.      A culture was sent to help evaluate your vaginal discharge.  Results should be available in the next 24 hours.  You may call the office and select option #2 to speak with the nurse to obtain the results.      Follow-up with your PCP and other healthcare specialist as needed.

## 2025-01-21 NOTE — PROGRESS NOTES
Assessment and Plan:  Mandy Taylor is a 58 y/o woman presenting today for annual GYN exam.     Diagnoses:  #1 Routine annual gynecologic exam  #2 Breast cancer screening  #3 Allergic rhinitis  #4 Vaginal discharge    Assessment/Plan:  1. Annual GYN exam  - Pap smear was sent.  - Mammogram requisition provided.  - Return to the office in one year or PRN.  - BV swab sent for discharge.  - Information about vulvovaginal care provided.  - Menopause magazine given.     Follow up with Dr. Bourne.    Treye Attestation  By signing my name below, I, Antonieta Branden Solo, attest that this documentation has been prepared under the direction and in the presence of Dory Bourne MD on 2025 at 11:28 AM.     HPI:   Mandy Taylor is a 58 y/o woman presenting today for annual GYN exam. She reports some vaginal discharge. She denies vulvovaginal itching or odor, vaginal bleeding, pelvic pain, hot flashes.    Past medical hx: PKD.    Family medical hx: Sister had breast cancer but is in remission. Mother had DM and PKD and father had HTN.     Surgical hx: Previous cardiac ablation. ACL repair in .    Social hx: No smoking, vaping, drug use, or alcohol use.    GYN hx: M10y/o. Menses stopped in her 40s. Hx of CT. Hx of an abnormal pap smear recently, without HPV testing. Currently SA. No hx of sexual abuse or rape.    OB hx: .  x4. SAB x3.     ROS:  Review of Systems   Constitutional:         Negative for hot flashes.   Genitourinary:  Positive for vaginal discharge. Negative for pelvic pain and vaginal bleeding.        Negative for vulvovaginal itching.  Negative for vulvovaginal odor.     Physical Exam:   Physical Exam  Constitutional:       General: She is not in acute distress.     Appearance: Normal appearance.   Genitourinary:      Vulva exam comments: Normal appearing external female genitalia, normal Bartholin's and Hewlett's glands bilaterally  .      Vaginal exam comments: Somewhat dry and atrophic,  well supported.  .        Right Adnexa: not tender and no mass present.     Left Adnexa: not tender and no mass present.     No cervical motion tenderness.      Uterus is not enlarged, fixed or tender.      No uterine mass detected.  Breasts:     Right: No inverted nipple, mass, nipple discharge or skin change.      Left: No inverted nipple, mass, nipple discharge or skin change.   HENT:      Head: Normocephalic and atraumatic.      Right Ear: Tympanic membrane, ear canal and external ear normal.      Left Ear: Tympanic membrane, ear canal and external ear normal.      Nose: Nose normal.      Mouth/Throat:      Mouth: Mucous membranes are moist.      Pharynx: Oropharynx is clear.   Eyes:      Extraocular Movements: Extraocular movements intact.      Conjunctiva/sclera: Conjunctivae normal.      Pupils: Pupils are equal, round, and reactive to light.   Neck:      Thyroid: No thyromegaly.   Cardiovascular:      Rate and Rhythm: Normal rate and regular rhythm.      Pulses: Normal pulses.      Heart sounds: Normal heart sounds.   Pulmonary:      Effort: Pulmonary effort is normal.      Breath sounds: Normal breath sounds and air entry.   Abdominal:      Palpations: Abdomen is soft.      Tenderness: There is no abdominal tenderness.   Musculoskeletal:      Cervical back: Neck supple.   Lymphadenopathy:      Upper Body:      Right upper body: No axillary adenopathy.      Left upper body: No axillary adenopathy.   Neurological:      Mental Status: She is alert and oriented to person, place, and time.      Comments: Pleasant and cooperative

## 2025-01-22 LAB
BACTERIAL VAGINOSIS VAG-IMP: NORMAL
CLUE CELLS VAG LPF-#/AREA: NORMAL /[LPF]
NUGENT SCORE: 4
YEAST VAG WET PREP-#/AREA: NORMAL

## 2025-01-27 ENCOUNTER — TELEPHONE (OUTPATIENT)
Dept: OBSTETRICS AND GYNECOLOGY | Facility: CLINIC | Age: 60
End: 2025-01-27
Payer: COMMERCIAL

## 2025-01-28 DIAGNOSIS — B96.89 BV (BACTERIAL VAGINOSIS): ICD-10-CM

## 2025-01-28 DIAGNOSIS — N76.0 BV (BACTERIAL VAGINOSIS): ICD-10-CM

## 2025-01-28 RX ORDER — METRONIDAZOLE 500 MG/1
500 TABLET ORAL 2 TIMES DAILY
Qty: 14 TABLET | Refills: 0 | Status: SHIPPED | OUTPATIENT
Start: 2025-01-28 | End: 2025-02-04

## 2025-02-15 LAB
BASOPHILS # BLD AUTO: 29 CELLS/UL (ref 0–200)
BASOPHILS NFR BLD AUTO: 0.5 %
EOSINOPHIL # BLD AUTO: 110 CELLS/UL (ref 15–500)
EOSINOPHIL NFR BLD AUTO: 1.9 %
ERYTHROCYTE [DISTWIDTH] IN BLOOD BY AUTOMATED COUNT: 12.2 % (ref 11–15)
FERRITIN SERPL-MCNC: 48 NG/ML (ref 16–232)
HCT VFR BLD AUTO: 42.9 % (ref 35–45)
HGB BLD-MCNC: 14 G/DL (ref 11.7–15.5)
IRON SATN MFR SERPL: 40 % (CALC) (ref 16–45)
IRON SERPL-MCNC: 126 MCG/DL (ref 45–160)
LYMPHOCYTES # BLD AUTO: 1270 CELLS/UL (ref 850–3900)
LYMPHOCYTES NFR BLD AUTO: 21.9 %
MCH RBC QN AUTO: 30.5 PG (ref 27–33)
MCHC RBC AUTO-ENTMCNC: 32.6 G/DL (ref 32–36)
MCV RBC AUTO: 93.5 FL (ref 80–100)
MONOCYTES # BLD AUTO: 273 CELLS/UL (ref 200–950)
MONOCYTES NFR BLD AUTO: 4.7 %
NEUTROPHILS # BLD AUTO: 4118 CELLS/UL (ref 1500–7800)
NEUTROPHILS NFR BLD AUTO: 71 %
PLATELET # BLD AUTO: 273 THOUSAND/UL (ref 140–400)
PMV BLD REES-ECKER: 10 FL (ref 7.5–12.5)
RBC # BLD AUTO: 4.59 MILLION/UL (ref 3.8–5.1)
TIBC SERPL-MCNC: 314 MCG/DL (CALC) (ref 250–450)
WBC # BLD AUTO: 5.8 THOUSAND/UL (ref 3.8–10.8)

## 2025-02-22 DIAGNOSIS — I10 BENIGN ESSENTIAL HYPERTENSION: ICD-10-CM

## 2025-02-24 RX ORDER — AMLODIPINE BESYLATE 5 MG/1
5 TABLET ORAL DAILY
Qty: 90 TABLET | Refills: 1 | Status: SHIPPED | OUTPATIENT
Start: 2025-02-24

## 2025-04-24 ENCOUNTER — TELEPHONE (OUTPATIENT)
Dept: OBSTETRICS AND GYNECOLOGY | Facility: CLINIC | Age: 60
End: 2025-04-24
Payer: COMMERCIAL

## 2025-04-24 DIAGNOSIS — R09.81 NASAL CONGESTION: Primary | ICD-10-CM

## 2025-04-24 RX ORDER — CETIRIZINE HYDROCHLORIDE 10 MG/1
10 TABLET, CHEWABLE ORAL DAILY
Qty: 90 TABLET | Refills: 3 | Status: SHIPPED | OUTPATIENT
Start: 2025-04-24

## 2025-04-24 NOTE — TELEPHONE ENCOUNTER
Name/ verified  Pt is C/O yellowish, clear vaginal discharge that has started again. She was treated for BV in January. Did not have symptoms and then recently it has started back up. Denies itching or vaginal odor. She states she has to wear a vaginal torsten pad. Requesting to be retreated or should she come in for apt. Message sent to Dr. Bourne to advise.  Pt verbalized understanding.

## 2025-04-28 ENCOUNTER — TELEPHONE (OUTPATIENT)
Dept: OBSTETRICS AND GYNECOLOGY | Facility: CLINIC | Age: 60
End: 2025-04-28
Payer: COMMERCIAL

## 2025-04-28 NOTE — TELEPHONE ENCOUNTER
RN called patient to discuss concerns for vaginitis  No answer at this time  Voicemail left informing patient to call the office to make an appointment per Dr. Steffen Yeh BSN, RN

## 2025-04-28 NOTE — TELEPHONE ENCOUNTER
----- Message from Nurse Eveline RAMIREZ sent at 4/24/2025  2:00 PM EDT -----  Hi. Please advise.....

## 2025-05-08 DIAGNOSIS — J30.9 ALLERGIC RHINITIS, UNSPECIFIED SEASONALITY, UNSPECIFIED TRIGGER: Primary | ICD-10-CM

## 2025-05-09 RX ORDER — FLUTICASONE PROPIONATE 50 MCG
1 SPRAY, SUSPENSION (ML) NASAL DAILY
Qty: 16 G | Refills: 11 | Status: SHIPPED | OUTPATIENT
Start: 2025-05-09 | End: 2026-05-09

## 2025-07-16 ENCOUNTER — APPOINTMENT (OUTPATIENT)
Dept: PRIMARY CARE | Facility: CLINIC | Age: 60
End: 2025-07-16
Payer: COMMERCIAL

## 2025-07-16 VITALS
HEIGHT: 62 IN | HEART RATE: 72 BPM | WEIGHT: 164 LBS | BODY MASS INDEX: 30.18 KG/M2 | OXYGEN SATURATION: 96 % | DIASTOLIC BLOOD PRESSURE: 72 MMHG | SYSTOLIC BLOOD PRESSURE: 115 MMHG

## 2025-07-16 DIAGNOSIS — H04.123 DRY EYE SYNDROME OF BILATERAL LACRIMAL GLANDS: ICD-10-CM

## 2025-07-16 DIAGNOSIS — Z12.11 SCREEN FOR COLON CANCER: ICD-10-CM

## 2025-07-16 DIAGNOSIS — Z00.00 ENCOUNTER FOR PREVENTATIVE ADULT HEALTH CARE EXAMINATION: ICD-10-CM

## 2025-07-16 DIAGNOSIS — I67.1 ANEURYSM, CEREBRAL (HHS-HCC): ICD-10-CM

## 2025-07-16 DIAGNOSIS — L30.4 INTERTRIGO: ICD-10-CM

## 2025-07-16 DIAGNOSIS — E61.1 IRON DEFICIENCY: ICD-10-CM

## 2025-07-16 DIAGNOSIS — I10 BENIGN ESSENTIAL HYPERTENSION: Primary | ICD-10-CM

## 2025-07-16 DIAGNOSIS — Q61.2 AUTOSOMAL DOMINANT POLYCYSTIC KIDNEY DISEASE: ICD-10-CM

## 2025-07-16 PROBLEM — M25.551 GREATER TROCHANTERIC PAIN SYNDROME OF RIGHT LOWER EXTREMITY: Status: RESOLVED | Noted: 2024-01-16 | Resolved: 2025-07-16

## 2025-07-16 PROCEDURE — 3074F SYST BP LT 130 MM HG: CPT | Performed by: INTERNAL MEDICINE

## 2025-07-16 PROCEDURE — 3008F BODY MASS INDEX DOCD: CPT | Performed by: INTERNAL MEDICINE

## 2025-07-16 PROCEDURE — 3078F DIAST BP <80 MM HG: CPT | Performed by: INTERNAL MEDICINE

## 2025-07-16 PROCEDURE — 1036F TOBACCO NON-USER: CPT | Performed by: INTERNAL MEDICINE

## 2025-07-16 PROCEDURE — 99396 PREV VISIT EST AGE 40-64: CPT | Performed by: INTERNAL MEDICINE

## 2025-07-16 RX ORDER — KETOCONAZOLE 20 MG/G
CREAM TOPICAL 2 TIMES DAILY
Qty: 30 G | Refills: 1 | Status: SHIPPED | OUTPATIENT
Start: 2025-07-16 | End: 2026-07-16

## 2025-07-16 RX ORDER — NYSTATIN 100000 [USP'U]/G
1 POWDER TOPICAL 2 TIMES DAILY
Qty: 60 G | Refills: 1 | Status: SHIPPED | OUTPATIENT
Start: 2025-07-16 | End: 2026-07-16

## 2025-07-16 ASSESSMENT — PATIENT HEALTH QUESTIONNAIRE - PHQ9
2. FEELING DOWN, DEPRESSED OR HOPELESS: NOT AT ALL
1. LITTLE INTEREST OR PLEASURE IN DOING THINGS: NOT AT ALL
SUM OF ALL RESPONSES TO PHQ9 QUESTIONS 1 AND 2: 0

## 2025-07-16 ASSESSMENT — PAIN SCALES - GENERAL: PAINLEVEL_OUTOF10: 0-NO PAIN

## 2025-07-16 NOTE — PROGRESS NOTES
Subjective   Patient ID: Mandy Taylor is a 59 y.o. female who presents for Annual Exam.  History of Present Illness  59-year-old female here for preventative care visit, last seen in January    She experiences excessive sweating, particularly under her breasts, leading to irritation and redness. Witch hazel provides some relief, and the issue is more pronounced in the summer and with cotton clothing. She is trying moisture-wicking clothing to alleviate the problem.    She is taking amlodipine for blood pressure management. She had presumed angioedema to ACEi, causing significant foot swelling, which required urgent care. Her blood pressure readings at home are around 117-127 systolic. No chest pain, palpitations, or shortness of breath.    She has a family history of polycystic kidney disease on her mother's side. Her mother had cardiac issues, and her sister has breast cancer and unspecified kidney issues. She is concerned about the hereditary nature of these conditions and has informed her children.    She experiences occasional hip pain and joint pain. She had restless legs, which improved with iron supplementation, and would like to recheck her levels.    She maintains a diet rich in fruits and vegetables, with occasional chicken and salmon. She exercises regularly, including walking, cycling, and strength exercises. She sleeps well, with one or two bathroom trips during the night. She denies any use of tobacco, alcohol, or drugs.    Past medical history  - HTN - on amlodipine 5 recommend home blood pressure monitoring  - GERD - previously on PPI discontinued. Uses tums prn.   - Prediabetes-resolved - strong family history of diabetes.   - Polycystic kidney disease - followed by nephrology at the [facility] Dr. cMgowan recommended consideration for tolvaptan with MRI prior, she currently defers (allergy to ACE, possible cross reactivity with ARB)  - Cerebral aneurysm, multiple small- last imaging  "in 2016 - referred to neurosurgery seen by NP MRA head 10/24 stable aneurysms recommended repeat 3 years 10/27  - Polycystic liver disease - seen by Dr. Mathew last seen 1/25 history of hepatic abscess with drainage followup 1 year   - Hip dysplasia - seen by Dr. Jim Blank s/p PT now improved.   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 Mo   - Dyslipidemia, mild atheromatous calcification - CAC 2/24 0   - Restless legs and iron deficiency-on oral iron noted to have iron deficiency recommended follow-up with GI screen for celiac and H. pylori which were negative refer to gastroenterology now resolved.   - ?Hypothyroidism previously on levothyroxine, last TFTs wnl   - Nasal congestion - flonase and zyrtec, improves symptoms.      Social:   - Lives at home with  and 25 year old son  - Drives school van   - 4 children, 2 grandchildren one on the way, youngest 25   - No tobacco, alcohol or illicits    Lifestyle   - Diet - fruits and vegetables, chicken and salmon. Stays away from sugary foods. Limits twizzlers and popcorn, tries to make healthy choices  - Exercise - every day,  ngo waking and riding around with the bike, msall amounts of weight  - Sleep - well, on average 8 hours gets up once or twice for the bathroom but is able to fall back asleep        PMHx, FHx, Social Hx, Surg Hx personally reviewed at this appointment. No pertinent findings and/or changes from prior (if applicable).      Objective     /85   Pulse 72   Ht 1.575 m (5' 2\")   Wt 74.4 kg (164 lb)   SpO2 96%   BMI 30.00 kg/m²    General: Awake, alert, appears stated age   Head/eyes/ears: NCAT, EOMI, PERRL, TM WNL, no cerumen  Throat: mucus membranes moist, pharynx unremarkable   Neck: Supple, nontender, no lymphadenopathy, thyroid exam unremarkable   Heart: RRR, no murmurs, rubs or gallops  Lungs: No wheezes, rhonchi or whales,  Breast exam: No palpable lumps, no discharge, no noted axillary lymphadenopathy. Chaperone " offered and declined   Abdomen: Soft, NT/ND  Extremities: No edema, 2+ DP pulses   Skin: erythematous patches with satellite papules appreciated beneath bilateral breasts, no oozing, exudate or crusting, no scale.   Neuro: AAO x 3, no FND, gait unremarkable       Lab Results   Component Value Date    WBC 5.8 02/14/2025    HGB 14.0 02/14/2025    HCT 42.9 02/14/2025     02/14/2025    CHOL 217 (H) 07/10/2024    TRIG 108 07/10/2024    HDL 79.1 07/10/2024    ALT 16 07/10/2024    AST 19 07/10/2024     07/10/2024    K 3.9 07/10/2024     07/10/2024    CREATININE 0.95 07/10/2024    BUN 17 07/10/2024    CO2 32 07/10/2024    TSH 0.90 07/10/2024    INR 1.2 (H) 02/07/2023    HGBA1C 5.6 07/10/2024         Current Outpatient Medications   Medication Instructions    acetaminophen (Tylenol) 325 mg tablet Every 4 hours PRN    amLODIPine (NORVASC) 5 mg, oral, Daily    cetirizine (ZYRTEC) 10 mg, oral, Daily    cyanocobalamin, vitamin B-12, (VITAMIN B-12 ORAL) Take by mouth.    ferrous sulfate 325 mg, 3 times daily (morning, midday, late afternoon)    fluticasone (Flonase) 50 mcg/actuation nasal spray 1 spray, Each Nostril, Daily, Shake gently. Before first use, prime pump. After use, clean tip and replace cap.        MR angio head wo IV contrast  Narrative: Interpreted By:  Kenny Goode,   STUDY:  MR ANGIO HEAD WO IV CONTRAST;  10/21/2024 9:53 am      INDICATION:  Signs/Symptoms:cerebral aneurysm.      COMPARISON:  December 2016.      ACCESSION NUMBER(S):  [id number]      ORDERING CLINICIAN:  PRERNA OREILLY      TECHNIQUE:  3D volume acquisition 3D reconstruction was performed at the skull  base and [place] of [place].      FINDINGS:  The distal internal carotid arteries and vertebrobasilar junction are  normal.      The previously demonstrated aneurysm arising within the ophthalmic  segment of the left internal carotid artery is unchanged in size  continuing to measure a proximally 3 mm.      The previously  demonstrated aneurysm arising in the ophthalmic  segment of the right internal carotid artery is unchanged in size  compared to the previous examination continuing to measure a  proximally 2 mm      The pericavernous carotid arteries are normal. The anterior cerebral  arteries, posterior cerebral arteries and middle cerebral arteries  are normal. There are no new or additional intracranial aneurysm,  vascular malformation or branch occlusion.      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      No new aneurysms.      MACRO:  none      Signed by: Kenny Goode 10/23/2024 6:48 AM  Dictation workstation:   [id number]           Assessment & Plan  Adult Health Examination  Age appropriate screening performed   Depression screen negative   No additional pertinent family history or toxic habits   No high risk sexual behavior declines STI screen   Cancer screening:  - Colonoscopy 2/16 normal repeat 10 years ordered for repeat   - Mammogram 9/24 ordered for repeat   - Pap smear + HPV -1/25 with Dr. Bourne  - Skin cancer prevention strategies reviewed  Immunizations:   - Due: flu shot recommended in October, COVID booster declined, Prevnar deferred.   - UTD: shingrix, Tdap,   Dental and visual exams UTD   Discussed adequate vitamin D intake.      Intertrigo with possible fungal infection  Possible fungal infection due to moisture accumulation under breasts.  - Prescribe ketoconazole cream once daily until rash resolves.  - Prescribe antifungal powder for maintenance to keep area dry.    Benign essential hypertension  Blood pressure normalized with amlodipine. History of angioedema with ACE inhibitors limits antihypertensive options. Discussed ARB cross-reactivity and kidney protection.  - Continue amlodipine 5 mg daily.  - Monitor blood pressure at home and report significant changes.  - Consult nephrologist regarding potential ARB use for kidney protection.    Polycystic kidney  disease  Managed with amlodipine. Discussed ARB benefits for kidney protection and associated risks.  - Continue current management with amlodipine.  - Consult nephrologist regarding potential ARB use for kidney protection.    Cerebral aneurysms  Multiple small cerebral aneurysms stable on recent evaluations.    Atrioventricular anh reentrant tachycardia (AVNRT)  No recent AVNRT episodes. Last cardiologist follow-up in January.  - Continue current management and follow up with cardiologist as needed.    Restless legs syndrome and iron deficiency  Restless legs syndrome resolved after iron supplementation. Iron levels need re-evaluation.  - Order iron studies to reassess iron levels.    General Health Maintenance  Due for routine health screenings and vaccinations. Discussed healthy lifestyle and resistance exercises to prevent frailty.  - Schedule colonoscopy for February 2026.  - Order mammogram for September.  - Recommend flu shot in October and discuss pneumonia vaccination.  - Encourage regular exercise and a balanced diet.  - Recommend vitamin D supplementation and check levels if needed.  - Incorporate resistance exercises to prevent frailty and muscle loss.    Follow-up  Several follow-up appointments and tests to schedule.  - Schedule follow-up appointments as needed.  - Ensure completion of ordered tests and screenings.    Followup 6-12 months       Emily Funes DO       This medical note was created with the assistance of artificial intelligence (AI) for documentation purposes. The content has been reviewed and confirmed by the healthcare provider for accuracy and completeness. Patient consented to the use of audio recording and use of AI during their visit.

## 2025-07-16 NOTE — PATIENT INSTRUCTIONS
Mandy,   Schedule colonoscopy for February   Mammogram - Call [phone] to have this scheduled.    Flu and pneumococcal vaccinations can be scheduled together     Followup 6-12 months

## 2025-07-18 LAB
25(OH)D3+25(OH)D2 SERPL-MCNC: 34 NG/ML (ref 30–100)
ALBUMIN SERPL-MCNC: 4.3 G/DL (ref 3.6–5.1)
ALBUMIN/GLOB SERPL: 1.3 (CALC) (ref 1–2.5)
ALP SERPL-CCNC: 80 U/L (ref 37–153)
ALT SERPL-CCNC: 23 U/L (ref 6–29)
AST SERPL-CCNC: 27 U/L (ref 10–35)
BILIRUB DIRECT SERPL-MCNC: 0.1 MG/DL
BILIRUB INDIRECT SERPL-MCNC: 0.4 MG/DL (CALC) (ref 0.2–1.2)
BILIRUB SERPL-MCNC: 0.5 MG/DL (ref 0.2–1.2)
CHOLEST SERPL-MCNC: 188 MG/DL
CHOLEST/HDLC SERPL: 2.5 (CALC)
EST. AVERAGE GLUCOSE BLD GHB EST-MCNC: 126 MG/DL
EST. AVERAGE GLUCOSE BLD GHB EST-SCNC: 7 MMOL/L
FERRITIN SERPL-MCNC: 58 NG/ML (ref 16–232)
GLOBULIN SER CALC-MCNC: 3.4 G/DL (CALC) (ref 1.9–3.7)
HBA1C MFR BLD: 6 %
HDLC SERPL-MCNC: 76 MG/DL
IRON SATN MFR SERPL: 25 % (CALC) (ref 16–45)
IRON SERPL-MCNC: 83 MCG/DL (ref 45–160)
LDLC SERPL CALC-MCNC: 93 MG/DL (CALC)
NONHDLC SERPL-MCNC: 112 MG/DL (CALC)
PROT SERPL-MCNC: 7.7 G/DL (ref 6.1–8.1)
TIBC SERPL-MCNC: 334 MCG/DL (CALC) (ref 250–450)
TRIGL SERPL-MCNC: 98 MG/DL
TSH SERPL-ACNC: 0.7 MIU/L (ref 0.4–4.5)

## 2025-08-29 DIAGNOSIS — I10 BENIGN ESSENTIAL HYPERTENSION: ICD-10-CM

## 2025-09-02 RX ORDER — AMLODIPINE BESYLATE 5 MG/1
5 TABLET ORAL DAILY
Qty: 90 TABLET | Refills: 1 | Status: SHIPPED | OUTPATIENT
Start: 2025-09-02

## 2026-01-27 ENCOUNTER — APPOINTMENT (OUTPATIENT)
Dept: OBSTETRICS AND GYNECOLOGY | Facility: CLINIC | Age: 61
End: 2026-01-27
Payer: COMMERCIAL